# Patient Record
Sex: MALE | Race: WHITE | HISPANIC OR LATINO | Employment: OTHER | URBAN - METROPOLITAN AREA
[De-identification: names, ages, dates, MRNs, and addresses within clinical notes are randomized per-mention and may not be internally consistent; named-entity substitution may affect disease eponyms.]

---

## 2017-01-10 ENCOUNTER — APPOINTMENT (OUTPATIENT)
Dept: LAB | Facility: HOSPITAL | Age: 72
End: 2017-01-10
Payer: COMMERCIAL

## 2017-01-10 ENCOUNTER — GENERIC CONVERSION - ENCOUNTER (OUTPATIENT)
Dept: OTHER | Facility: OTHER | Age: 72
End: 2017-01-10

## 2017-01-10 ENCOUNTER — TRANSCRIBE ORDERS (OUTPATIENT)
Dept: LAB | Facility: HOSPITAL | Age: 72
End: 2017-01-10

## 2017-01-10 DIAGNOSIS — E11.9 TYPE 2 DIABETES MELLITUS WITHOUT COMPLICATIONS (HCC): ICD-10-CM

## 2017-01-10 LAB
ALBUMIN SERPL BCP-MCNC: 3.9 G/DL (ref 3.5–5)
ALP SERPL-CCNC: 79 U/L (ref 46–116)
ALT SERPL W P-5'-P-CCNC: 22 U/L (ref 12–78)
ANION GAP SERPL CALCULATED.3IONS-SCNC: 7 MMOL/L (ref 4–13)
AST SERPL W P-5'-P-CCNC: 11 U/L (ref 5–45)
BILIRUB SERPL-MCNC: 1.06 MG/DL (ref 0.2–1)
BUN SERPL-MCNC: 14 MG/DL (ref 5–25)
CALCIUM SERPL-MCNC: 8.5 MG/DL (ref 8.3–10.1)
CHLORIDE SERPL-SCNC: 97 MMOL/L (ref 100–108)
CHOLEST SERPL-MCNC: 130 MG/DL (ref 50–200)
CO2 SERPL-SCNC: 32 MMOL/L (ref 21–32)
CREAT SERPL-MCNC: 0.96 MG/DL (ref 0.6–1.3)
EST. AVERAGE GLUCOSE BLD GHB EST-MCNC: 174 MG/DL
GFR SERPL CREATININE-BSD FRML MDRD: >60 ML/MIN/1.73SQ M
GLUCOSE SERPL-MCNC: 168 MG/DL (ref 65–140)
HBA1C MFR BLD: 7.7 % (ref 4.2–6.3)
HDLC SERPL-MCNC: 55 MG/DL (ref 40–60)
LDLC SERPL CALC-MCNC: 61 MG/DL (ref 0–100)
POTASSIUM SERPL-SCNC: 4.1 MMOL/L (ref 3.5–5.3)
PROT SERPL-MCNC: 7.1 G/DL (ref 6.4–8.2)
SODIUM SERPL-SCNC: 136 MMOL/L (ref 136–145)
TRIGL SERPL-MCNC: 72 MG/DL
TSH SERPL DL<=0.05 MIU/L-ACNC: 2.77 UIU/ML (ref 0.36–3.74)

## 2017-01-10 PROCEDURE — 83036 HEMOGLOBIN GLYCOSYLATED A1C: CPT

## 2017-01-10 PROCEDURE — 36415 COLL VENOUS BLD VENIPUNCTURE: CPT

## 2017-01-10 PROCEDURE — 84443 ASSAY THYROID STIM HORMONE: CPT

## 2017-01-10 PROCEDURE — 80053 COMPREHEN METABOLIC PANEL: CPT

## 2017-01-10 PROCEDURE — 80061 LIPID PANEL: CPT

## 2017-01-13 ENCOUNTER — ALLSCRIPTS OFFICE VISIT (OUTPATIENT)
Dept: OTHER | Facility: OTHER | Age: 72
End: 2017-01-13

## 2017-02-10 ENCOUNTER — ALLSCRIPTS OFFICE VISIT (OUTPATIENT)
Dept: OTHER | Facility: OTHER | Age: 72
End: 2017-02-10

## 2017-02-23 ENCOUNTER — GENERIC CONVERSION - ENCOUNTER (OUTPATIENT)
Dept: OTHER | Facility: OTHER | Age: 72
End: 2017-02-23

## 2017-02-23 ENCOUNTER — ALLSCRIPTS OFFICE VISIT (OUTPATIENT)
Dept: OTHER | Facility: OTHER | Age: 72
End: 2017-02-23

## 2017-03-23 ENCOUNTER — GENERIC CONVERSION - ENCOUNTER (OUTPATIENT)
Dept: OTHER | Facility: OTHER | Age: 72
End: 2017-03-23

## 2017-03-28 ENCOUNTER — ALLSCRIPTS OFFICE VISIT (OUTPATIENT)
Dept: OTHER | Facility: OTHER | Age: 72
End: 2017-03-28

## 2017-03-28 DIAGNOSIS — H81.10 BENIGN PAROXYSMAL VERTIGO: ICD-10-CM

## 2017-04-03 ENCOUNTER — APPOINTMENT (OUTPATIENT)
Dept: PHYSICAL THERAPY | Facility: CLINIC | Age: 72
End: 2017-04-03
Payer: COMMERCIAL

## 2017-04-03 DIAGNOSIS — H81.10 BENIGN PAROXYSMAL VERTIGO: ICD-10-CM

## 2017-04-03 PROCEDURE — G8991 OTHER PT/OT GOAL STATUS: HCPCS

## 2017-04-03 PROCEDURE — 97162 PT EVAL MOD COMPLEX 30 MIN: CPT

## 2017-04-03 PROCEDURE — G8990 OTHER PT/OT CURRENT STATUS: HCPCS

## 2017-04-03 PROCEDURE — 97112 NEUROMUSCULAR REEDUCATION: CPT

## 2017-04-04 ENCOUNTER — GENERIC CONVERSION - ENCOUNTER (OUTPATIENT)
Dept: OTHER | Facility: OTHER | Age: 72
End: 2017-04-04

## 2017-04-05 ENCOUNTER — APPOINTMENT (OUTPATIENT)
Dept: PHYSICAL THERAPY | Facility: CLINIC | Age: 72
End: 2017-04-05
Payer: COMMERCIAL

## 2017-04-05 ENCOUNTER — GENERIC CONVERSION - ENCOUNTER (OUTPATIENT)
Dept: OTHER | Facility: OTHER | Age: 72
End: 2017-04-05

## 2017-04-10 ENCOUNTER — TRANSCRIBE ORDERS (OUTPATIENT)
Dept: LAB | Facility: HOSPITAL | Age: 72
End: 2017-04-10

## 2017-04-10 ENCOUNTER — APPOINTMENT (OUTPATIENT)
Dept: LAB | Facility: HOSPITAL | Age: 72
End: 2017-04-10
Payer: COMMERCIAL

## 2017-04-10 DIAGNOSIS — E11.65 TYPE 2 DIABETES MELLITUS WITH HYPERGLYCEMIA (HCC): ICD-10-CM

## 2017-04-10 LAB
EST. AVERAGE GLUCOSE BLD GHB EST-MCNC: 146 MG/DL
HBA1C MFR BLD: 6.7 % (ref 4.2–6.3)

## 2017-04-10 PROCEDURE — 83036 HEMOGLOBIN GLYCOSYLATED A1C: CPT

## 2017-04-10 PROCEDURE — 36415 COLL VENOUS BLD VENIPUNCTURE: CPT

## 2017-04-28 ENCOUNTER — ALLSCRIPTS OFFICE VISIT (OUTPATIENT)
Dept: OTHER | Facility: OTHER | Age: 72
End: 2017-04-28

## 2017-05-16 ENCOUNTER — ALLSCRIPTS OFFICE VISIT (OUTPATIENT)
Dept: OTHER | Facility: OTHER | Age: 72
End: 2017-05-16

## 2017-08-04 ENCOUNTER — TRANSCRIBE ORDERS (OUTPATIENT)
Dept: LAB | Facility: HOSPITAL | Age: 72
End: 2017-08-04

## 2017-08-10 DIAGNOSIS — E11.9 TYPE 2 DIABETES MELLITUS WITHOUT COMPLICATIONS (HCC): ICD-10-CM

## 2017-08-11 ENCOUNTER — GENERIC CONVERSION - ENCOUNTER (OUTPATIENT)
Dept: OTHER | Facility: OTHER | Age: 72
End: 2017-08-11

## 2017-08-11 ENCOUNTER — APPOINTMENT (OUTPATIENT)
Dept: LAB | Facility: HOSPITAL | Age: 72
End: 2017-08-11
Payer: COMMERCIAL

## 2017-08-11 DIAGNOSIS — E11.9 TYPE 2 DIABETES MELLITUS WITHOUT COMPLICATIONS (HCC): ICD-10-CM

## 2017-08-11 LAB
ALBUMIN SERPL BCP-MCNC: 3.7 G/DL (ref 3.5–5)
ALP SERPL-CCNC: 76 U/L (ref 46–116)
ALT SERPL W P-5'-P-CCNC: 22 U/L (ref 12–78)
ANION GAP SERPL CALCULATED.3IONS-SCNC: 5 MMOL/L (ref 4–13)
AST SERPL W P-5'-P-CCNC: 15 U/L (ref 5–45)
BASOPHILS # BLD AUTO: 0.1 THOUSANDS/ΜL (ref 0–0.1)
BASOPHILS NFR BLD AUTO: 1 % (ref 0–1)
BILIRUB SERPL-MCNC: 0.77 MG/DL (ref 0.2–1)
BUN SERPL-MCNC: 12 MG/DL (ref 5–25)
CALCIUM SERPL-MCNC: 8.4 MG/DL (ref 8.3–10.1)
CHLORIDE SERPL-SCNC: 101 MMOL/L (ref 100–108)
CHOLEST SERPL-MCNC: 115 MG/DL (ref 50–200)
CO2 SERPL-SCNC: 29 MMOL/L (ref 21–32)
CREAT SERPL-MCNC: 0.93 MG/DL (ref 0.6–1.3)
CREAT UR-MCNC: 79.2 MG/DL
EOSINOPHIL # BLD AUTO: 3.03 THOUSAND/ΜL (ref 0–0.61)
EOSINOPHIL NFR BLD AUTO: 28 % (ref 0–6)
ERYTHROCYTE [DISTWIDTH] IN BLOOD BY AUTOMATED COUNT: 14.4 % (ref 11.6–15.1)
EST. AVERAGE GLUCOSE BLD GHB EST-MCNC: 143 MG/DL
GFR SERPL CREATININE-BSD FRML MDRD: 83 ML/MIN/1.73SQ M
GLUCOSE P FAST SERPL-MCNC: 132 MG/DL (ref 65–99)
HBA1C MFR BLD: 6.6 % (ref 4.2–6.3)
HCT VFR BLD AUTO: 42.9 % (ref 36.5–49.3)
HDLC SERPL-MCNC: 48 MG/DL (ref 40–60)
HGB BLD-MCNC: 14.3 G/DL (ref 12–17)
LDLC SERPL CALC-MCNC: 52 MG/DL (ref 0–100)
LYMPHOCYTES # BLD AUTO: 3.04 THOUSANDS/ΜL (ref 0.6–4.47)
LYMPHOCYTES NFR BLD AUTO: 28 % (ref 14–44)
MCH RBC QN AUTO: 30.6 PG (ref 26.8–34.3)
MCHC RBC AUTO-ENTMCNC: 33.3 G/DL (ref 31.4–37.4)
MCV RBC AUTO: 92 FL (ref 82–98)
MICROALBUMIN UR-MCNC: 8.8 MG/L (ref 0–20)
MICROALBUMIN/CREAT 24H UR: 11 MG/G CREATININE (ref 0–30)
MONOCYTES # BLD AUTO: 0.57 THOUSAND/ΜL (ref 0.17–1.22)
MONOCYTES NFR BLD AUTO: 5 % (ref 4–12)
NEUTROPHILS # BLD AUTO: 4.17 THOUSANDS/ΜL (ref 1.85–7.62)
NEUTS SEG NFR BLD AUTO: 38 % (ref 43–75)
NRBC BLD AUTO-RTO: 0 /100 WBCS
PLATELET # BLD AUTO: 271 THOUSANDS/UL (ref 149–390)
PMV BLD AUTO: 10.3 FL (ref 8.9–12.7)
POTASSIUM SERPL-SCNC: 4.5 MMOL/L (ref 3.5–5.3)
PROT SERPL-MCNC: 7.1 G/DL (ref 6.4–8.2)
RBC # BLD AUTO: 4.68 MILLION/UL (ref 3.88–5.62)
SODIUM SERPL-SCNC: 135 MMOL/L (ref 136–145)
TRIGL SERPL-MCNC: 77 MG/DL
TSH SERPL DL<=0.05 MIU/L-ACNC: 2.52 UIU/ML (ref 0.36–3.74)
WBC # BLD AUTO: 10.93 THOUSAND/UL (ref 4.31–10.16)

## 2017-08-11 PROCEDURE — 80053 COMPREHEN METABOLIC PANEL: CPT

## 2017-08-11 PROCEDURE — 82570 ASSAY OF URINE CREATININE: CPT

## 2017-08-11 PROCEDURE — 36415 COLL VENOUS BLD VENIPUNCTURE: CPT

## 2017-08-11 PROCEDURE — 80061 LIPID PANEL: CPT

## 2017-08-11 PROCEDURE — 82043 UR ALBUMIN QUANTITATIVE: CPT

## 2017-08-11 PROCEDURE — 85025 COMPLETE CBC W/AUTO DIFF WBC: CPT

## 2017-08-11 PROCEDURE — 83036 HEMOGLOBIN GLYCOSYLATED A1C: CPT

## 2017-08-11 PROCEDURE — 84443 ASSAY THYROID STIM HORMONE: CPT

## 2017-08-29 ENCOUNTER — ALLSCRIPTS OFFICE VISIT (OUTPATIENT)
Dept: OTHER | Facility: OTHER | Age: 72
End: 2017-08-29

## 2017-09-18 ENCOUNTER — GENERIC CONVERSION - ENCOUNTER (OUTPATIENT)
Dept: OTHER | Facility: OTHER | Age: 72
End: 2017-09-18

## 2017-09-22 ENCOUNTER — HOSPITAL ENCOUNTER (EMERGENCY)
Facility: HOSPITAL | Age: 72
Discharge: HOME | End: 2017-09-22
Attending: EMERGENCY MEDICINE
Payer: COMMERCIAL

## 2017-09-22 ENCOUNTER — OFFICE VISIT (OUTPATIENT)
Dept: URGENT CARE | Age: 72
End: 2017-09-22
Payer: COMMERCIAL

## 2017-09-22 ENCOUNTER — APPOINTMENT (EMERGENCY)
Dept: CT IMAGING | Facility: HOSPITAL | Age: 72
End: 2017-09-22
Payer: COMMERCIAL

## 2017-09-22 VITALS
TEMPERATURE: 98.2 F | RESPIRATION RATE: 16 BRPM | WEIGHT: 178 LBS | OXYGEN SATURATION: 93 % | SYSTOLIC BLOOD PRESSURE: 129 MMHG | DIASTOLIC BLOOD PRESSURE: 63 MMHG | HEART RATE: 72 BPM

## 2017-09-22 DIAGNOSIS — R20.0 LEFT FACIAL NUMBNESS: Primary | ICD-10-CM

## 2017-09-22 LAB
ANION GAP SERPL CALCULATED.3IONS-SCNC: 6 MMOL/L (ref 4–13)
BASOPHILS # BLD AUTO: 0.1 THOUSANDS/ΜL (ref 0–0.1)
BASOPHILS NFR BLD AUTO: 1 % (ref 0–1)
BUN SERPL-MCNC: 11 MG/DL (ref 5–25)
CALCIUM SERPL-MCNC: 9.9 MG/DL (ref 8.3–10.1)
CHLORIDE SERPL-SCNC: 101 MMOL/L (ref 100–108)
CO2 SERPL-SCNC: 32 MMOL/L (ref 21–32)
CREAT SERPL-MCNC: 0.88 MG/DL (ref 0.6–1.3)
EOSINOPHIL # BLD AUTO: 6.41 THOUSAND/ΜL (ref 0–0.61)
EOSINOPHIL NFR BLD AUTO: 43 % (ref 0–6)
ERYTHROCYTE [DISTWIDTH] IN BLOOD BY AUTOMATED COUNT: 14.2 % (ref 11.6–15.1)
GFR SERPL CREATININE-BSD FRML MDRD: 88 ML/MIN/1.73SQ M
GLUCOSE SERPL-MCNC: 116 MG/DL (ref 65–140)
HCT VFR BLD AUTO: 43.7 % (ref 36.5–49.3)
HGB BLD-MCNC: 14.9 G/DL (ref 12–17)
LYMPHOCYTES # BLD AUTO: 2.98 THOUSANDS/ΜL (ref 0.6–4.47)
LYMPHOCYTES NFR BLD AUTO: 20 % (ref 14–44)
MCH RBC QN AUTO: 31.4 PG (ref 26.8–34.3)
MCHC RBC AUTO-ENTMCNC: 34.1 G/DL (ref 31.4–37.4)
MCV RBC AUTO: 92 FL (ref 82–98)
MONOCYTES # BLD AUTO: 0.58 THOUSAND/ΜL (ref 0.17–1.22)
MONOCYTES NFR BLD AUTO: 4 % (ref 4–12)
NEUTROPHILS # BLD AUTO: 4.84 THOUSANDS/ΜL (ref 1.85–7.62)
NEUTS SEG NFR BLD AUTO: 32 % (ref 43–75)
NRBC BLD AUTO-RTO: 0 /100 WBCS
PLATELET # BLD AUTO: 308 THOUSANDS/UL (ref 149–390)
PMV BLD AUTO: 10.1 FL (ref 8.9–12.7)
POTASSIUM SERPL-SCNC: 3.8 MMOL/L (ref 3.5–5.3)
RBC # BLD AUTO: 4.74 MILLION/UL (ref 3.88–5.62)
SODIUM SERPL-SCNC: 139 MMOL/L (ref 136–145)
WBC # BLD AUTO: 14.91 THOUSAND/UL (ref 4.31–10.16)

## 2017-09-22 PROCEDURE — S9088 SERVICES PROVIDED IN URGENT: HCPCS | Performed by: FAMILY MEDICINE

## 2017-09-22 PROCEDURE — 99213 OFFICE O/P EST LOW 20 MIN: CPT | Performed by: FAMILY MEDICINE

## 2017-09-22 PROCEDURE — 80048 BASIC METABOLIC PNL TOTAL CA: CPT | Performed by: EMERGENCY MEDICINE

## 2017-09-22 PROCEDURE — 36415 COLL VENOUS BLD VENIPUNCTURE: CPT | Performed by: EMERGENCY MEDICINE

## 2017-09-22 PROCEDURE — 99284 EMERGENCY DEPT VISIT MOD MDM: CPT

## 2017-09-22 PROCEDURE — 70450 CT HEAD/BRAIN W/O DYE: CPT

## 2017-09-22 PROCEDURE — 85025 COMPLETE CBC W/AUTO DIFF WBC: CPT | Performed by: EMERGENCY MEDICINE

## 2017-09-22 RX ORDER — AMLODIPINE BESYLATE 10 MG/1
10 TABLET ORAL DAILY
COMMUNITY
End: 2018-06-07 | Stop reason: SDUPTHER

## 2017-09-22 RX ORDER — TADALAFIL 20 MG/1
20 TABLET ORAL DAILY PRN
COMMUNITY

## 2017-09-22 RX ORDER — PREDNISONE 20 MG/1
60 TABLET ORAL DAILY
Qty: 15 TABLET | Refills: 0 | Status: SHIPPED | OUTPATIENT
Start: 2017-09-22 | End: 2017-09-27

## 2017-09-22 RX ORDER — FINASTERIDE 5 MG/1
5 TABLET, FILM COATED ORAL DAILY
COMMUNITY
End: 2018-04-29 | Stop reason: SDUPTHER

## 2017-09-22 RX ORDER — MONTELUKAST SODIUM 10 MG/1
10 TABLET ORAL
COMMUNITY
End: 2018-08-28 | Stop reason: SDUPTHER

## 2017-09-22 RX ORDER — LISINOPRIL AND HYDROCHLOROTHIAZIDE 20; 12.5 MG/1; MG/1
1 TABLET ORAL DAILY
COMMUNITY
End: 2018-06-08 | Stop reason: SDUPTHER

## 2017-09-22 RX ORDER — FLUTICASONE PROPIONATE 50 MCG
1 SPRAY, SUSPENSION (ML) NASAL DAILY
COMMUNITY
End: 2018-12-18 | Stop reason: SDUPTHER

## 2017-09-22 RX ORDER — GLIMEPIRIDE 2 MG/1
2 TABLET ORAL
COMMUNITY
End: 2018-03-31 | Stop reason: SDUPTHER

## 2017-09-22 RX ORDER — CHLORAL HYDRATE 500 MG
1000 CAPSULE ORAL DAILY
COMMUNITY

## 2017-09-22 RX ORDER — MELATONIN
1000 DAILY
COMMUNITY

## 2017-09-22 RX ORDER — MELOXICAM 7.5 MG/1
7.5 TABLET ORAL DAILY
COMMUNITY
End: 2017-11-13 | Stop reason: HOSPADM

## 2017-09-22 RX ORDER — MULTIVIT-MIN/IRON FUM/FOLIC AC 7.5 MG-4
1 TABLET ORAL DAILY
COMMUNITY

## 2017-09-22 RX ORDER — AZELASTINE HCL 205.5 UG/1
2 SPRAY NASAL DAILY
COMMUNITY

## 2017-09-25 ENCOUNTER — LAB REQUISITION (OUTPATIENT)
Dept: LAB | Facility: HOSPITAL | Age: 72
End: 2017-09-25
Payer: COMMERCIAL

## 2017-09-25 ENCOUNTER — TRANSCRIBE ORDERS (OUTPATIENT)
Dept: ADMINISTRATIVE | Facility: HOSPITAL | Age: 72
End: 2017-09-25

## 2017-09-25 ENCOUNTER — GENERIC CONVERSION - ENCOUNTER (OUTPATIENT)
Dept: OTHER | Facility: OTHER | Age: 72
End: 2017-09-25

## 2017-09-25 ENCOUNTER — APPOINTMENT (OUTPATIENT)
Dept: LAB | Facility: HOSPITAL | Age: 72
End: 2017-09-25
Payer: COMMERCIAL

## 2017-09-25 DIAGNOSIS — M35.00 SICCA SYNDROME (HCC): ICD-10-CM

## 2017-09-25 DIAGNOSIS — E40 EDEMA DUE TO KWASHIORKOR (HCC): Primary | ICD-10-CM

## 2017-09-25 LAB
CRP SERPL QL: 4.8 MG/L
ERYTHROCYTE [SEDIMENTATION RATE] IN BLOOD: 2 MM/HOUR (ref 0–10)

## 2017-09-25 PROCEDURE — 36415 COLL VENOUS BLD VENIPUNCTURE: CPT

## 2017-09-25 PROCEDURE — 85652 RBC SED RATE AUTOMATED: CPT | Performed by: FAMILY MEDICINE

## 2017-09-25 PROCEDURE — 86038 ANTINUCLEAR ANTIBODIES: CPT | Performed by: FAMILY MEDICINE

## 2017-09-25 PROCEDURE — 86235 NUCLEAR ANTIGEN ANTIBODY: CPT

## 2017-09-25 PROCEDURE — 86140 C-REACTIVE PROTEIN: CPT | Performed by: FAMILY MEDICINE

## 2017-09-26 LAB
ENA SS-A AB SER-ACNC: <0.2 AI (ref 0–0.9)
ENA SS-B AB SER-ACNC: <0.2 AI (ref 0–0.9)

## 2017-09-27 LAB — RYE IGE QN: NEGATIVE

## 2017-10-03 ENCOUNTER — GENERIC CONVERSION - ENCOUNTER (OUTPATIENT)
Dept: OTHER | Facility: OTHER | Age: 72
End: 2017-10-03

## 2017-10-03 ENCOUNTER — HOSPITAL ENCOUNTER (OUTPATIENT)
Dept: RADIOLOGY | Facility: HOSPITAL | Age: 72
Discharge: HOME | End: 2017-10-03
Payer: COMMERCIAL

## 2017-10-03 DIAGNOSIS — E40 EDEMA DUE TO KWASHIORKOR (HCC): ICD-10-CM

## 2017-10-03 PROCEDURE — 70490 CT SOFT TISSUE NECK W/O DYE: CPT

## 2017-11-09 ENCOUNTER — HOSPITAL ENCOUNTER (INPATIENT)
Facility: HOSPITAL | Age: 72
LOS: 2 days | Discharge: HOME | DRG: 379 | End: 2017-11-13
Attending: EMERGENCY MEDICINE | Admitting: INTERNAL MEDICINE
Payer: COMMERCIAL

## 2017-11-09 DIAGNOSIS — K92.1 MELENA: ICD-10-CM

## 2017-11-09 DIAGNOSIS — K62.5 BRBPR (BRIGHT RED BLOOD PER RECTUM): ICD-10-CM

## 2017-11-09 DIAGNOSIS — K62.5 BRIGHT RED BLOOD PER RECTUM: Primary | ICD-10-CM

## 2017-11-09 PROBLEM — I10 HYPERTENSION: Chronic | Status: ACTIVE | Noted: 2017-11-09

## 2017-11-09 PROBLEM — E13.9 DIABETES 1.5, MANAGED AS TYPE 2 (HCC): Status: ACTIVE | Noted: 2017-11-09

## 2017-11-09 PROBLEM — D72.829 LEUKOCYTOSIS: Status: ACTIVE | Noted: 2017-11-09

## 2017-11-09 PROBLEM — M19.90 OSTEOARTHRITIS: Status: ACTIVE | Noted: 2017-11-09

## 2017-11-09 PROBLEM — M19.90 OSTEOARTHRITIS: Chronic | Status: ACTIVE | Noted: 2017-11-09

## 2017-11-09 PROBLEM — I10 HYPERTENSION: Status: ACTIVE | Noted: 2017-11-09

## 2017-11-09 PROBLEM — E13.9 DIABETES 1.5, MANAGED AS TYPE 2 (HCC): Chronic | Status: ACTIVE | Noted: 2017-11-09

## 2017-11-09 LAB
ABO GROUP BLD: NORMAL
ALBUMIN SERPL BCP-MCNC: 3.4 G/DL (ref 3.5–5)
ALP SERPL-CCNC: 89 U/L (ref 46–116)
ALT SERPL W P-5'-P-CCNC: 20 U/L (ref 12–78)
ANION GAP SERPL CALCULATED.3IONS-SCNC: 8 MMOL/L (ref 4–13)
APTT PPP: 30 SECONDS (ref 23–35)
AST SERPL W P-5'-P-CCNC: 11 U/L (ref 5–45)
BASOPHILS # BLD AUTO: 0.12 THOUSANDS/ΜL (ref 0–0.1)
BASOPHILS NFR BLD AUTO: 1 % (ref 0–1)
BILIRUB SERPL-MCNC: 0.39 MG/DL (ref 0.2–1)
BLD GP AB SCN SERPL QL: NEGATIVE
BUN SERPL-MCNC: 15 MG/DL (ref 5–25)
CALCIUM SERPL-MCNC: 8.1 MG/DL (ref 8.3–10.1)
CHLORIDE SERPL-SCNC: 104 MMOL/L (ref 100–108)
CO2 SERPL-SCNC: 26 MMOL/L (ref 21–32)
CREAT SERPL-MCNC: 0.82 MG/DL (ref 0.6–1.3)
EOSINOPHIL # BLD AUTO: 1.18 THOUSAND/ΜL (ref 0–0.61)
EOSINOPHIL NFR BLD AUTO: 7 % (ref 0–6)
ERYTHROCYTE [DISTWIDTH] IN BLOOD BY AUTOMATED COUNT: 14.2 % (ref 11.6–15.1)
GFR SERPL CREATININE-BSD FRML MDRD: 90 ML/MIN/1.73SQ M
GLUCOSE SERPL-MCNC: 200 MG/DL (ref 65–140)
GLUCOSE SERPL-MCNC: 83 MG/DL (ref 65–140)
HCT VFR BLD AUTO: 42.2 % (ref 36.5–49.3)
HGB BLD-MCNC: 14.3 G/DL (ref 12–17)
INR PPP: 1.19 (ref 0.86–1.16)
LYMPHOCYTES # BLD AUTO: 2.2 THOUSANDS/ΜL (ref 0.6–4.47)
LYMPHOCYTES NFR BLD AUTO: 14 % (ref 14–44)
MCH RBC QN AUTO: 31.2 PG (ref 26.8–34.3)
MCHC RBC AUTO-ENTMCNC: 33.9 G/DL (ref 31.4–37.4)
MCV RBC AUTO: 92 FL (ref 82–98)
MONOCYTES # BLD AUTO: 1.11 THOUSAND/ΜL (ref 0.17–1.22)
MONOCYTES NFR BLD AUTO: 7 % (ref 4–12)
NEUTROPHILS # BLD AUTO: 11.68 THOUSANDS/ΜL (ref 1.85–7.62)
NEUTS SEG NFR BLD AUTO: 71 % (ref 43–75)
NRBC BLD AUTO-RTO: 0 /100 WBCS
PA ADP BLD-ACNC: 434 ARU
PLATELET # BLD AUTO: 273 THOUSANDS/UL (ref 149–390)
PMV BLD AUTO: 10.6 FL (ref 8.9–12.7)
POTASSIUM SERPL-SCNC: 4 MMOL/L (ref 3.5–5.3)
PROT SERPL-MCNC: 6.4 G/DL (ref 6.4–8.2)
PROTHROMBIN TIME: 15.2 SECONDS (ref 12.1–14.4)
RBC # BLD AUTO: 4.59 MILLION/UL (ref 3.88–5.62)
RH BLD: POSITIVE
SODIUM SERPL-SCNC: 138 MMOL/L (ref 136–145)
SPECIMEN EXPIRATION DATE: NORMAL
WBC # BLD AUTO: 16.35 THOUSAND/UL (ref 4.31–10.16)

## 2017-11-09 PROCEDURE — 99285 EMERGENCY DEPT VISIT HI MDM: CPT

## 2017-11-09 PROCEDURE — 80053 COMPREHEN METABOLIC PANEL: CPT | Performed by: EMERGENCY MEDICINE

## 2017-11-09 PROCEDURE — 86850 RBC ANTIBODY SCREEN: CPT | Performed by: EMERGENCY MEDICINE

## 2017-11-09 PROCEDURE — C9113 INJ PANTOPRAZOLE SODIUM, VIA: HCPCS | Performed by: HOSPITALIST

## 2017-11-09 PROCEDURE — 94760 N-INVAS EAR/PLS OXIMETRY 1: CPT

## 2017-11-09 PROCEDURE — 82948 REAGENT STRIP/BLOOD GLUCOSE: CPT

## 2017-11-09 PROCEDURE — 85025 COMPLETE CBC W/AUTO DIFF WBC: CPT | Performed by: EMERGENCY MEDICINE

## 2017-11-09 PROCEDURE — 86900 BLOOD TYPING SEROLOGIC ABO: CPT | Performed by: EMERGENCY MEDICINE

## 2017-11-09 PROCEDURE — 85730 THROMBOPLASTIN TIME PARTIAL: CPT | Performed by: EMERGENCY MEDICINE

## 2017-11-09 PROCEDURE — 86901 BLOOD TYPING SEROLOGIC RH(D): CPT | Performed by: EMERGENCY MEDICINE

## 2017-11-09 PROCEDURE — 85576 BLOOD PLATELET AGGREGATION: CPT | Performed by: EMERGENCY MEDICINE

## 2017-11-09 PROCEDURE — 36415 COLL VENOUS BLD VENIPUNCTURE: CPT | Performed by: EMERGENCY MEDICINE

## 2017-11-09 PROCEDURE — 85610 PROTHROMBIN TIME: CPT | Performed by: EMERGENCY MEDICINE

## 2017-11-09 RX ORDER — AMLODIPINE BESYLATE 10 MG/1
10 TABLET ORAL DAILY
Status: DISCONTINUED | OUTPATIENT
Start: 2017-11-10 | End: 2017-11-10

## 2017-11-09 RX ORDER — SENNOSIDES 8.6 MG
1 TABLET ORAL DAILY
Status: DISCONTINUED | OUTPATIENT
Start: 2017-11-10 | End: 2017-11-10

## 2017-11-09 RX ORDER — FINASTERIDE 5 MG/1
5 TABLET, FILM COATED ORAL DAILY
Status: DISCONTINUED | OUTPATIENT
Start: 2017-11-10 | End: 2017-11-13 | Stop reason: HOSPADM

## 2017-11-09 RX ORDER — ACETAMINOPHEN 325 MG/1
650 TABLET ORAL EVERY 6 HOURS PRN
Status: DISCONTINUED | OUTPATIENT
Start: 2017-11-09 | End: 2017-11-13 | Stop reason: HOSPADM

## 2017-11-09 RX ORDER — MONTELUKAST SODIUM 10 MG/1
10 TABLET ORAL
Status: DISCONTINUED | OUTPATIENT
Start: 2017-11-09 | End: 2017-11-13 | Stop reason: HOSPADM

## 2017-11-09 RX ORDER — MECLIZINE HCL 12.5 MG/1
25 TABLET ORAL 3 TIMES DAILY PRN
Status: DISCONTINUED | OUTPATIENT
Start: 2017-11-09 | End: 2017-11-13 | Stop reason: HOSPADM

## 2017-11-09 RX ORDER — MELATONIN
1000 DAILY
Status: DISCONTINUED | OUTPATIENT
Start: 2017-11-10 | End: 2017-11-13 | Stop reason: HOSPADM

## 2017-11-09 RX ORDER — PANTOPRAZOLE SODIUM 40 MG/1
40 INJECTION, POWDER, FOR SOLUTION INTRAVENOUS EVERY 12 HOURS SCHEDULED
Status: DISCONTINUED | OUTPATIENT
Start: 2017-11-09 | End: 2017-11-13 | Stop reason: HOSPADM

## 2017-11-09 RX ORDER — AZELASTINE HCL 205.5 UG/1
2 SPRAY NASAL DAILY
Status: DISCONTINUED | OUTPATIENT
Start: 2017-11-10 | End: 2017-11-09 | Stop reason: CLARIF

## 2017-11-09 RX ORDER — ONDANSETRON 2 MG/ML
4 INJECTION INTRAMUSCULAR; INTRAVENOUS EVERY 6 HOURS PRN
Status: DISCONTINUED | OUTPATIENT
Start: 2017-11-09 | End: 2017-11-13 | Stop reason: HOSPADM

## 2017-11-09 RX ORDER — OXYCODONE HYDROCHLORIDE 5 MG/1
2.5 TABLET ORAL EVERY 4 HOURS PRN
Status: DISCONTINUED | OUTPATIENT
Start: 2017-11-09 | End: 2017-11-13 | Stop reason: HOSPADM

## 2017-11-09 RX ORDER — FLUTICASONE PROPIONATE 50 MCG
1 SPRAY, SUSPENSION (ML) NASAL DAILY
Status: DISCONTINUED | OUTPATIENT
Start: 2017-11-10 | End: 2017-11-13 | Stop reason: HOSPADM

## 2017-11-09 RX ORDER — AZELASTINE 1 MG/ML
2 SPRAY, METERED NASAL DAILY
Status: DISCONTINUED | OUTPATIENT
Start: 2017-11-10 | End: 2017-11-13 | Stop reason: HOSPADM

## 2017-11-09 RX ORDER — CHLORAL HYDRATE 500 MG
1000 CAPSULE ORAL DAILY
Status: DISCONTINUED | OUTPATIENT
Start: 2017-11-10 | End: 2017-11-13 | Stop reason: HOSPADM

## 2017-11-09 RX ORDER — OXYCODONE HYDROCHLORIDE 5 MG/1
5 TABLET ORAL EVERY 4 HOURS PRN
Status: DISCONTINUED | OUTPATIENT
Start: 2017-11-09 | End: 2017-11-13 | Stop reason: HOSPADM

## 2017-11-09 RX ORDER — DOCUSATE SODIUM 100 MG/1
100 CAPSULE, LIQUID FILLED ORAL 2 TIMES DAILY
Status: DISCONTINUED | OUTPATIENT
Start: 2017-11-09 | End: 2017-11-10

## 2017-11-09 RX ORDER — SODIUM CHLORIDE 9 MG/ML
75 INJECTION, SOLUTION INTRAVENOUS CONTINUOUS
Status: DISCONTINUED | OUTPATIENT
Start: 2017-11-09 | End: 2017-11-10

## 2017-11-09 RX ADMIN — DOCUSATE SODIUM 100 MG: 100 CAPSULE, LIQUID FILLED ORAL at 22:55

## 2017-11-09 RX ADMIN — SODIUM CHLORIDE 75 ML/HR: 0.9 INJECTION, SOLUTION INTRAVENOUS at 22:58

## 2017-11-09 RX ADMIN — PANTOPRAZOLE SODIUM 40 MG: 40 INJECTION, POWDER, FOR SOLUTION INTRAVENOUS at 22:55

## 2017-11-09 RX ADMIN — MONTELUKAST SODIUM 10 MG: 10 TABLET, FILM COATED ORAL at 22:55

## 2017-11-09 NOTE — CONSULTS
Consultation - Colorectal Surgery   Jen Calvillo 81 y o  male MRN: [id number]  Unit/Bed#: ED 05 Encounter: [id number]    Assessment/Plan     Assessment:  - 82 yo male patient with  chronic use of aspirin and meloxicam, presenting with initial episode of melena and 2 more episodes of possible hematochezia  - Currently hemodynamically stable  - No further bleeding      Plan:  Outpatient f/u with Dr Harrison Kayser        History of Present Illness   HPI:  Jen Calvillo is a 81 y o  male who presents with 3 episodes of GI bleeding, patient has past medical history significant for diabetes, hypertension and arthritis, patient uses aspirin and meloxicam daily, states that today he went to the bathroom and noticed tarry stools on the toilet, after that initial episode he had another 2 episodes of lower GI bleeding, these 2 last episodes had possibly brighter blood  Patient denies any symptoms associated to the episode of bleeding, currently denies any nausea or vomiting, denies any abdominal pain, denies diarrhea, patient states he had a colonoscopy 3 years ago that was normal, no family history of colon cancer or inflammatory bowel disease, no previous abdominal surgeries    Consults    Review of Systems   Constitutional: Negative  HENT: Negative  Eyes: Negative  Respiratory: Negative  Cardiovascular: Negative  Gastrointestinal: Positive for anal bleeding  Negative for abdominal pain, diarrhea, nausea, rectal pain and vomiting  Genitourinary: Negative  Musculoskeletal: Negative  Skin: Negative  Allergic/Immunologic: Negative  Neurological: Negative  Hematological: Negative  Psychiatric/Behavioral: Negative  All other systems reviewed and are negative  Historical Information   Past Medical History:   Diagnosis Date    Diabetes mellitus ([facility] [address] )     Hyperlipidemia     Hypertension      History reviewed  No pertinent surgical history    Social History   History   Alcohol Use    Yes Comment: social     History   Drug Use No     History   Smoking Status    Never Smoker   Smokeless Tobacco    Never Used     Family History: History reviewed  No pertinent family history  Meds/Allergies   all current active meds have been reviewed, current meds:   No current facility-administered medications for this encounter  and PTA meds:   Prior to Admission Medications   Prescriptions Last Dose Informant Patient Reported? Taking?    Azelastine HCl 0 15 % SOLN   Yes No   Si sprays into each nostril daily   Multiple Vitamins-Minerals (MULTIVITAMIN WITH MINERALS) tablet   Yes No   Sig: Take 1 tablet by mouth daily   Omega-3 Fatty Acids (FISH OIL) 1,000 mg   Yes No   Sig: Take 1,000 mg by mouth daily   amLODIPine (NORVASC) 10 mg tablet   Yes No   Sig: Take 10 mg by mouth daily   cholecalciferol (VITAMIN D3) 1,000 units tablet   Yes No   Sig: Take 1,000 Units by mouth daily   finasteride (PROSCAR) 5 mg tablet   Yes No   Sig: Take 5 mg by mouth daily   fluticasone (FLONASE) 50 mcg/act nasal spray   Yes No   Si spray into each nostril daily   glimepiride (AMARYL) 2 mg tablet   Yes No   Sig: Take 2 mg by mouth every morning before breakfast   glucose blood test strip   Yes No   Si each by Other route as needed Use as instructed   hypromellose (ARTIFICIAL TEARS) 0 4 % SOLN   Yes No   Si drop 4 (four) times a day as needed for dry eyes   lisinopril-hydrochlorothiazide (PRINZIDE,ZESTORETIC) 20-12 5 MG per tablet   Yes No   Sig: Take 1 tablet by mouth daily   meclizine (ANTIVERT) 32 MG tablet   Yes No   Sig: Take 25 mg by mouth 3 (three) times a day as needed for dizziness   meloxicam (MOBIC) 7 5 mg tablet   Yes No   Sig: Take 7 5 mg by mouth daily   metFORMIN (GLUCOPHAGE) 1000 MG tablet   Yes No   Sig: Take 1,000 mg by mouth 2 (two) times a day with meals   montelukast (SINGULAIR) 10 mg tablet   Yes No   Sig: Take 10 mg by mouth daily at bedtime   sitaGLIPtin (JANUVIA) 25 mg tablet   Yes No Sig: Take 25 mg by mouth daily   tadalafil (CIALIS) 20 MG tablet   Yes No   Sig: Take 20 mg by mouth daily as needed for erectile dysfunction      Facility-Administered Medications: None     Allergies   Allergen Reactions    Iodinated Diagnostic Agents        Objective   First Vitals:   Blood Pressure: 154/80 (11/09/17 1722)  Pulse: 105 (11/09/17 1722)  Temperature: 98 1 °F (36 7 °C) (11/09/17 1722)  Temp Source: Oral (11/09/17 1722)  Respirations: 18 (11/09/17 1722)  Weight - Scale: 76 2 kg (168 lb) (11/09/17 1722)  SpO2: 97 % (11/09/17 1722)    Current Vitals:   Blood Pressure: 133/70 (11/09/17 1815)  Pulse: 98 (11/09/17 1815)  Temperature: 98 1 °F (36 7 °C) (11/09/17 1722)  Temp Source: Oral (11/09/17 1722)  Respirations: 18 (11/09/17 1722)  Weight - Scale: 76 2 kg (168 lb) (11/09/17 1722)  SpO2: 96 % (11/09/17 1815)    No intake or output data in the 24 hours ending 11/09/17 1850    Invasive Devices          No matching active lines, drains, or airways          Physical Exam   Constitutional: He is oriented to person, place, and time  He appears well-developed and well-nourished  HENT:   Head: Normocephalic and atraumatic  Eyes: Pupils are equal, round, and reactive to light  Neck: Normal range of motion  Neck supple  Cardiovascular: Normal rate and regular rhythm  Pulmonary/Chest: Effort normal and breath sounds normal    Abdominal: Soft  Bowel sounds are normal  He exhibits no distension and no mass  There is no tenderness  There is no rebound and no guarding  Genitourinary: Rectum normal    Genitourinary Comments: Rectal exam showed no external hemorrhoids and brown stools   Musculoskeletal: Normal range of motion  Neurological: He is alert and oriented to person, place, and time  Skin: Skin is warm and dry  Psychiatric: He has a normal mood and affect  Nursing note and vitals reviewed  Lab Results:   I have personally reviewed pertinent lab results    , CBC:   Lab Results Component Value Date    WBC 16 35 (H) 11/09/2017    HGB 14 3 11/09/2017    HCT 42 2 11/09/2017    MCV 92 11/09/2017     11/09/2017    MCH 31 2 11/09/2017    MCHC 33 9 11/09/2017    RDW 14 2 11/09/2017    MPV 10 6 11/09/2017    NRBC 0 11/09/2017   , CMP: No results found for: NA, K, CL, CO2, ANIONGAP, BUN, CREATININE, GLUCOSE, CALCIUM, AST, ALT, ALKPHOS, PROT, ALBUMIN, BILITOT, EGFR, Coagulation: No results found for: PT, INR, APTT, Urinalysis: No results found for: COLORU, CLARITYU, SPECGRAV, PHUR, LEUKOCYTESUR, NITRITE, PROTEINUA, GLUCOSEU, KETONESU, BILIRUBINUR, BLOODU, Amylase: No results found for: AMYLASE, Lipase: No results found for: LIPASE  Imaging: I have personally reviewed pertinent reports  and I have personally reviewed pertinent films in PACS  EKG, Pathology, and Other Studies: I have personally reviewed pertinent reports  and I have personally reviewed pertinent films in PACS    Counseling / Coordination of Care  Total floor / unit time spent today 60 minutes  Greater than 50% of total time was spent with the patient and / or family counseling and / or coordination of care

## 2017-11-09 NOTE — ED ATTENDING ATTESTATION
Eloise Sousa DO, saw and evaluated the patient  I have discussed the patient with the resident/non-physician practitioner and agree with the resident's/non-physician practitioner's findings, Plan of Care, and MDM as documented in the resident's/non-physician practitioner's note, except where noted  All available labs and Radiology studies were reviewed  At this point I agree with the current assessment done in the Emergency Department  I have conducted an independent evaluation of this patient including a focused history and a physical exam     ED Note - Yasir Will 81 y o  male MRN: [id number]  Unit/Bed#: ED 05 Encounter: [id number]    History of Present Illness   HPI  Yasir Will is a 81 y o  male who presents for evaluation of GI hemorrhage as evidenced by bright red blood per rectum  Symptoms onset approximately 1600 hours today  Patient states that he did feel some diffuse abdominal pressure and when he went to the bathroom, he noted bright red blood in the toilet bowl  Patient had an additional episode in the emergency department  He is otherwise asymptomatic at this time  History of diverticulosis  Pt  Does take ASA daily  No other complaints  REVIEW OF SYSTEMS  See HPI for further details  12 systems reviewed and otherwise negative except as noted  Historical Information     PAST MEDICAL HISTORY  Past Medical History:   Diagnosis Date    Diabetes mellitus ([facility] [address] )     Hyperlipidemia     Hypertension        FAMILY HISTORY  History reviewed  No pertinent family history      SOCIAL HISTORY  Social History     Social History    Marital status: /Civil Union     Spouse name: N/A    Number of children: N/A    Years of education: N/A     Social History Main Topics    Smoking status: Never Smoker    Smokeless tobacco: Never Used    Alcohol use Yes      Comment: social    Drug use: No    Sexual activity: Not Asked     Other Topics Concern    None     Social History Narrative  None       SURGICAL HISTORY  History reviewed  No pertinent surgical history  Meds/Allergies     CURRENT MEDICATIONS  No current facility-administered medications for this encounter       Current Outpatient Prescriptions:     amLODIPine (NORVASC) 10 mg tablet, Take 10 mg by mouth daily, Disp: , Rfl:     Azelastine HCl 0 15 % SOLN, 2 sprays into each nostril daily, Disp: , Rfl:     cholecalciferol (VITAMIN D3) 1,000 units tablet, Take 1,000 Units by mouth daily, Disp: , Rfl:     finasteride (PROSCAR) 5 mg tablet, Take 5 mg by mouth daily, Disp: , Rfl:     fluticasone (FLONASE) 50 mcg/act nasal spray, 1 spray into each nostril daily, Disp: , Rfl:     glimepiride (AMARYL) 2 mg tablet, Take 2 mg by mouth every morning before breakfast, Disp: , Rfl:     glucose blood test strip, 1 each by Other route as needed Use as instructed, Disp: , Rfl:     hypromellose (ARTIFICIAL TEARS) 0 4 % SOLN, 1 drop 4 (four) times a day as needed for dry eyes, Disp: , Rfl:     lisinopril-hydrochlorothiazide (PRINZIDE,ZESTORETIC) 20-12 5 MG per tablet, Take 1 tablet by mouth daily, Disp: , Rfl:     meclizine (ANTIVERT) 32 MG tablet, Take 25 mg by mouth 3 (three) times a day as needed for dizziness, Disp: , Rfl:     meloxicam (MOBIC) 7 5 mg tablet, Take 7 5 mg by mouth daily, Disp: , Rfl:     metFORMIN (GLUCOPHAGE) 1000 MG tablet, Take 1,000 mg by mouth 2 (two) times a day with meals, Disp: , Rfl:     montelukast (SINGULAIR) 10 mg tablet, Take 10 mg by mouth daily at bedtime, Disp: , Rfl:     Multiple Vitamins-Minerals (MULTIVITAMIN WITH MINERALS) tablet, Take 1 tablet by mouth daily, Disp: , Rfl:     Omega-3 Fatty Acids (FISH OIL) 1,000 mg, Take 1,000 mg by mouth daily, Disp: , Rfl:     sitaGLIPtin (JANUVIA) 25 mg tablet, Take 25 mg by mouth daily, Disp: , Rfl:     tadalafil (CIALIS) 20 MG tablet, Take 20 mg by mouth daily as needed for erectile dysfunction, Disp: , Rfl:     (Not in a hospital admission)    ALLERGIES  Allergies   Allergen Reactions    Iodinated Diagnostic Agents      Objective     PHYSICAL EXAM    VITAL SIGNS: Blood pressure 133/70, pulse 98, temperature 98 1 °F (36 7 °C), temperature source Oral, resp  rate 18, weight 76 2 kg (168 lb), SpO2 96 %  Constitutional:  Well developed, well nourished, no acute distress, non-toxic appearance   Eyes:  PERRL, EOMI, conjunctivae pink, sclerae non-icteric  HENT:  Normocephalic/Atraumatic, no rhinorrhea, mucous membranes moist  Respiratory:  No respiratory distress, normal breath sounds  Cardiovascular:  Normal rate, normal rhythm    GI:  Soft, non-tender, non-distended, normal bowel sounds, no organomegaly, no mass, no rebound, no guarding  Hemoccult grossly positive  :  No CVAT, no flank ecchymosis   Musculoskeletal:  No swelling or edema, no tenderness, no deformities  Integument:  Pink, warm, dry, Well hydrated, no rash, no erythema, no bullae   Lymphatic:  No cervical/ tonsillar/ submandibular lymphadenopathy noted   Neurologic:  Awake, Alert & oriented x 3, CN 2-12 intact, no focal neurological deficits  Psychiatric:  Speech and behavior appropriate       ED COURSE and MDM:    Assessment/Plan   Assessment:  82 yo male presents for evaluation of GI hemorrhage as evidenced by bright red blood per rectum  Symptoms onset approximately 1600 hours today  Patient states that he did feel some diffuse abdominal pressure and when he went to the bathroom, he noted bright red blood in the toilet bowl  Patient had an additional episode in the emergency department  He is otherwise asymptomatic at this time  History of diverticulosis  Pt  Does take ASA daily  No other complaints  Patient with severe anaphylactic reaction to IV contrast      Plan:  Lab, PRBC prn, Platelets prn, symptom management, colorectal surgery consult, GI consult, admission  Portions of the record may have been created with voice recognition software   Occasional wrong word or "sound a like" substitutions may have occurred due to the inherent limitations of voice recognition software       ED Provider  Electronically Signed by

## 2017-11-10 ENCOUNTER — GENERIC CONVERSION - ENCOUNTER (OUTPATIENT)
Dept: OTHER | Facility: OTHER | Age: 72
End: 2017-11-10

## 2017-11-10 ENCOUNTER — ANESTHESIA EVENT (OUTPATIENT)
Dept: GASTROENTEROLOGY | Facility: HOSPITAL | Age: 72
DRG: 379 | End: 2017-11-10
Payer: COMMERCIAL

## 2017-11-10 ENCOUNTER — ANESTHESIA (OUTPATIENT)
Dept: GASTROENTEROLOGY | Facility: HOSPITAL | Age: 72
DRG: 379 | End: 2017-11-10
Payer: COMMERCIAL

## 2017-11-10 PROBLEM — K92.1 MELENA: Status: ACTIVE | Noted: 2017-11-09

## 2017-11-10 LAB
ANION GAP SERPL CALCULATED.3IONS-SCNC: 6 MMOL/L (ref 4–13)
BUN SERPL-MCNC: 14 MG/DL (ref 5–25)
CALCIUM SERPL-MCNC: 7.6 MG/DL (ref 8.3–10.1)
CHLORIDE SERPL-SCNC: 105 MMOL/L (ref 100–108)
CO2 SERPL-SCNC: 25 MMOL/L (ref 21–32)
CREAT SERPL-MCNC: 0.75 MG/DL (ref 0.6–1.3)
ERYTHROCYTE [DISTWIDTH] IN BLOOD BY AUTOMATED COUNT: 14.1 % (ref 11.6–15.1)
GFR SERPL CREATININE-BSD FRML MDRD: 93 ML/MIN/1.73SQ M
GLUCOSE SERPL-MCNC: 114 MG/DL (ref 65–140)
GLUCOSE SERPL-MCNC: 123 MG/DL (ref 65–140)
GLUCOSE SERPL-MCNC: 139 MG/DL (ref 65–140)
GLUCOSE SERPL-MCNC: 150 MG/DL (ref 65–140)
GLUCOSE SERPL-MCNC: 236 MG/DL (ref 65–140)
HCT VFR BLD AUTO: 34.4 % (ref 36.5–49.3)
HCT VFR BLD AUTO: 34.8 % (ref 36.5–49.3)
HCT VFR BLD AUTO: 36.6 % (ref 36.5–49.3)
HGB BLD-MCNC: 11.4 G/DL (ref 12–17)
HGB BLD-MCNC: 11.7 G/DL (ref 12–17)
HGB BLD-MCNC: 12.3 G/DL (ref 12–17)
MCH RBC QN AUTO: 31 PG (ref 26.8–34.3)
MCHC RBC AUTO-ENTMCNC: 33.6 G/DL (ref 31.4–37.4)
MCV RBC AUTO: 92 FL (ref 82–98)
PLATELET # BLD AUTO: 226 THOUSANDS/UL (ref 149–390)
PMV BLD AUTO: 10.1 FL (ref 8.9–12.7)
POTASSIUM SERPL-SCNC: 5 MMOL/L (ref 3.5–5.3)
RBC # BLD AUTO: 3.78 MILLION/UL (ref 3.88–5.62)
SODIUM SERPL-SCNC: 136 MMOL/L (ref 136–145)
WBC # BLD AUTO: 9.69 THOUSAND/UL (ref 4.31–10.16)

## 2017-11-10 PROCEDURE — 88342 IMHCHEM/IMCYTCHM 1ST ANTB: CPT | Performed by: INTERNAL MEDICINE

## 2017-11-10 PROCEDURE — 85014 HEMATOCRIT: CPT | Performed by: HOSPITALIST

## 2017-11-10 PROCEDURE — C9113 INJ PANTOPRAZOLE SODIUM, VIA: HCPCS | Performed by: HOSPITALIST

## 2017-11-10 PROCEDURE — 88341 IMHCHEM/IMCYTCHM EA ADD ANTB: CPT | Performed by: INTERNAL MEDICINE

## 2017-11-10 PROCEDURE — 85018 HEMOGLOBIN: CPT | Performed by: HOSPITALIST

## 2017-11-10 PROCEDURE — 88305 TISSUE EXAM BY PATHOLOGIST: CPT | Performed by: INTERNAL MEDICINE

## 2017-11-10 PROCEDURE — 85027 COMPLETE CBC AUTOMATED: CPT | Performed by: HOSPITALIST

## 2017-11-10 PROCEDURE — 82948 REAGENT STRIP/BLOOD GLUCOSE: CPT

## 2017-11-10 PROCEDURE — 80048 BASIC METABOLIC PNL TOTAL CA: CPT | Performed by: HOSPITALIST

## 2017-11-10 PROCEDURE — 0DB68ZX EXCISION OF STOMACH, VIA NATURAL OR ARTIFICIAL OPENING ENDOSCOPIC, DIAGNOSTIC: ICD-10-PCS | Performed by: INTERNAL MEDICINE

## 2017-11-10 RX ORDER — PROPOFOL 10 MG/ML
INJECTION, EMULSION INTRAVENOUS AS NEEDED
Status: DISCONTINUED | OUTPATIENT
Start: 2017-11-10 | End: 2017-11-10 | Stop reason: SURG

## 2017-11-10 RX ORDER — SODIUM CHLORIDE 9 MG/ML
100 INJECTION, SOLUTION INTRAVENOUS CONTINUOUS
Status: DISCONTINUED | OUTPATIENT
Start: 2017-11-10 | End: 2017-11-11

## 2017-11-10 RX ADMIN — FLUTICASONE PROPIONATE 1 SPRAY: 50 SPRAY, METERED NASAL at 08:36

## 2017-11-10 RX ADMIN — DOCUSATE SODIUM 100 MG: 100 CAPSULE, LIQUID FILLED ORAL at 08:31

## 2017-11-10 RX ADMIN — Medication 1 TABLET: at 08:36

## 2017-11-10 RX ADMIN — Medication 1000 MG: at 08:31

## 2017-11-10 RX ADMIN — VITAMIN D, TAB 1000IU (100/BT) 1000 UNITS: 25 TAB at 08:31

## 2017-11-10 RX ADMIN — AZELASTINE HYDROCHLORIDE 2 SPRAY: 137 SPRAY, METERED NASAL at 08:36

## 2017-11-10 RX ADMIN — PROPOFOL 50 MG: 10 INJECTION, EMULSION INTRAVENOUS at 13:14

## 2017-11-10 RX ADMIN — INSULIN LISPRO 2 UNITS: 100 INJECTION, SOLUTION INTRAVENOUS; SUBCUTANEOUS at 21:20

## 2017-11-10 RX ADMIN — PANTOPRAZOLE SODIUM 40 MG: 40 INJECTION, POWDER, FOR SOLUTION INTRAVENOUS at 21:20

## 2017-11-10 RX ADMIN — PROPOFOL 50 MG: 10 INJECTION, EMULSION INTRAVENOUS at 13:17

## 2017-11-10 RX ADMIN — FINASTERIDE 5 MG: 5 TABLET, FILM COATED ORAL at 08:36

## 2017-11-10 RX ADMIN — SENNOSIDES 8.6 MG: 8.6 TABLET, FILM COATED ORAL at 08:31

## 2017-11-10 RX ADMIN — PANTOPRAZOLE SODIUM 40 MG: 40 INJECTION, POWDER, FOR SOLUTION INTRAVENOUS at 08:33

## 2017-11-10 RX ADMIN — MONTELUKAST SODIUM 10 MG: 10 TABLET, FILM COATED ORAL at 21:20

## 2017-11-10 RX ADMIN — ONDANSETRON 4 MG: 2 INJECTION INTRAMUSCULAR; INTRAVENOUS at 19:06

## 2017-11-10 RX ADMIN — LISINOPRIL: 20 TABLET ORAL at 08:31

## 2017-11-10 RX ADMIN — PROPOFOL 50 MG: 10 INJECTION, EMULSION INTRAVENOUS at 13:20

## 2017-11-10 NOTE — ASSESSMENT & PLAN NOTE
· Patient presents with melena at home with several episodes in the Emergency Room  He denies recent weight loss  He states that he had a normal bowel movement yesterday morning at home and it wasn't until 4PM yesterday that he noticed dark colored stool  · Patient states he had approximately 5 dark loose stools overnight  He complains of feeling like he has abdominal gas and states that his lower abdominal region is tender to touch  · Admission hemoglobin 14 3, now 11 7 this AM  · Will trend CBC's q12 due to bleeding  · Type and screen active   · Patient denies any history of GI bleed   · Last  Colonoscopy was "several years ago" per patient  · Patient to follow with Dr Fozia Mcmillan, Colorectal as an outpatient  · GI consulted, plan for EGD today  Patient made NPO   Nursing aware  · Continue protonix IV q12  · Continue clear liquid diet  · ASA/Meloxicam on hold at this time given GI bleed  · Senna and docusate D/C'ed related to frequent bloody BM's  · Hemodynamically stable

## 2017-11-10 NOTE — CASE MANAGEMENT
Initial Clinical Review    Admission: Date/Time/Statement: Observation 11/9 @ 1954    Orders Placed This Encounter   Procedures    Place in Observation (expected length of stay for this patient is less than two midnights)     Standing Status:   Standing     Number of Occurrences:   1     Order Specific Question:   Admitting Physician     Answer:   Minh Magdaleno [[id number]]     Order Specific Question:   Level of Care     Answer:   Med Surg [[id number]]     ED: Date/Time/Mode of Arrival:   ED Arrival Information     Expected Arrival Acuity Means of Arrival Escorted By Service Admission Type    - 11/9/2017 17:18 Urgent Walk-In Self General Medicine Urgent    Arrival Complaint    blood in stool        Chief Complaint:   Chief Complaint   Patient presents with    Black or Bloody Stool     pt reports episode of bright red diarrhea prior to arrival  denies abd pain  reports 81 mg asa use daily  History of Illness: 81 y o  very pleasant male  with past medical history of allergic rhinitis,OA  on aspirin and meloxicam, DM, HTN who presented to the emergency room with GI bleeding started approximately at 4:00 p m  today  Patient denies any history of GI bleeds  He reports 3 -4 bloody BM, the first  two  were very  dark , the others just  BRBPR ,   associated with mild nonspecific lower abdominal discomfort  No family history of colon cancer or IBD  Patient had a colonoscopy less than 10 years ago with no significant findings  Hemoglobin 14 3 ,WBC 16 3 patient afebrile hemodynamically stable repeated hemoglobin at 3:00 p m  dropped to 12 3  Patient was seen by surgical team in the ER and outpatient workup with Burke Constantino was recommended  ER asked to admit on an observation basis for GI evaluation in a m  Patient states that he did feel some diffuse abdominal pressure and when he went to the bathroom, he noted bright red blood in the toilet bowl  Patient had an additional episode in the emergency department       ED Vital Signs:   ED Triage Vitals   Temperature Pulse Respirations Blood Pressure SpO2   11/09/17 1722 11/09/17 1722 11/09/17 1722 11/09/17 1722 11/09/17 1722   98 1 °F (36 7 °C) 105 18 154/80 97 %      Temp Source Heart Rate Source Patient Position - Orthostatic VS BP Location FiO2 (%)   11/09/17 1722 11/09/17 1915 11/09/17 1915 11/09/17 1915 --   Oral Monitor Lying Right arm       Pain Score       11/09/17 1722       No Pain        Wt Readings from Last 1 Encounters:   11/09/17 76 2 kg (168 lb)     Vital Signs (abnormal): WNL    Abnormal Labs:    Updated: 11/09/17 1927      Protime 15 2 (H) 12 1 - 14 4 seconds     INR 1 19 (H) 0 86 - 1 16       Updated: 11/09/17 1847      Updated: 11/09/17 1847        WBC 16 35 (H) 4 31 - 10 16 Thousand/uL     RBC 4 59 3 88 - 5 62 Million/uL     Hemoglobin 14 3 12 0 - 17 0 g/dL     Hematocrit 42 2 36 5 - 49 3 %        Updated: 11/10/17 0600        WBC 9 69 4 31 - 10 16 Thousand/uL     RBC 3 78 (L) 3 88 - 5 62 Million/uL     Hemoglobin 11 7 (L) 12 0 - 17 0 g/dL     Hematocrit 34 8 (L) 36 5 - 49 3 %      Diagnostic Test Results: No order    ED Treatment:   Medication Administration from 11/09/2017 1717 to 11/09/2017 2124     None        Past Medical/Surgical History:    Active Ambulatory Problems     Diagnosis Date Noted    No Active Ambulatory Problems     Resolved Ambulatory Problems     Diagnosis Date Noted    No Resolved Ambulatory Problems     Past Medical History:   Diagnosis Date    Diabetes mellitus ([facility] [address] )     Hyperlipidemia     Hypertension      Admitting Diagnosis: Blood in stool [K92 1]  Bright red blood per rectum [K62 5]  BRBPR (bright red blood per rectum) [K62 5]    Age/Sex: 81 y o  male    Assessment/Plan:   Hospital Problem List:      Principal Problem:    BRBPR (bright red blood per rectum)  Active Problems:    Leukocytosis    Hypertension    Diabetes 1 5, managed as type 2 (HCC)    Osteoarthritis      Plan for the Primary Problem(s):  · GI bleed with drop in hemoglobin from 14 3---> 12 3  · Will continue to monitor hemoglobin hematocrit and transfuse if needed  ? Clear liquid diet  ? Aspirin and meloxicam temporary held  ? Ppi IV b i d   ? GI consult  ? Leukocytosis unclear source of infection ,patient afebrile, will monitor off antibiotic will proceed with imaging study if recommended by GI     Plan for Additional Problems:   · OA  Pain management , meloxicam temporary held  · Hypertension continue home Rx  · Diabetes  Continue Accu-Cheks insulin sliding scale, sitagliptin and  metformin held     VTE Prophylaxis: Pharmacologic VTE Prophylaxis contraindicated due to GI bleed  / sequential compression device   Code Status: full  POLST: There is no POLST form on file for this patient (pre-hospital)      Admission Orders:  Clear liquid  11/10 GI Lab - EGD  Cardiac monitoring  Gastroenterology cons    Scheduled Meds:   azelastine 2 spray Each Nare Daily   cholecalciferol 1,000 Units Oral Daily   docusate sodium 100 mg Oral BID   finasteride 5 mg Oral Daily   fish oil 1,000 mg Oral Daily   fluticasone 1 spray Nasal Daily   insulin lispro 1-5 Units Subcutaneous HS   insulin lispro 1-6 Units Subcutaneous TID AC   lisinopril-hydrochlorothiazide (PRINZIDE 20/12  5) combo dose  Oral Daily   montelukast 10 mg Oral HS   multivitamin with minerals 1 tablet Oral Daily   pantoprazole 40 mg Intravenous Q12H [address]   senna 1 tablet Oral Daily     Continuous Infusions:   sodium chloride 100 mL/hr Last Rate: 100 mL/hr (11/10/17 0713)     PRN Meds:     acetaminophen    meclizine    ondansetron    oxyCODONE    -------------------------------------------------------------------------------------------------------    11/9 Gastroenterology cons:  82 yo male patient with  chronic use of aspirin and meloxicam, presenting with initial episode of melena and 2 more episodes of possible hematochezia  - Currently hemodynamically stable  - No further bleeding      Plan:  Outpatient f/u with Dr Ivania Guerrero

## 2017-11-10 NOTE — ASSESSMENT & PLAN NOTE
· WBC 16 35 on admission, down to 9 69 today     · Will continue to trend WBC  · Patient afebrile, will continue to trend  · Antibiotics not indicated at this time

## 2017-11-10 NOTE — PROGRESS NOTES
Progress Note - Julia Martinez 81 y o  male MRN: [id number]    Unit/Bed#: CW2 218-01 Encounter: [id number]        BRBPR (bright red blood per rectum)   Assessment & Plan    · Patient presents with melena at home with several episodes in the Emergency Room  He denies recent weight loss  He states that he had a normal bowel movement yesterday morning at home and it wasn't until 4PM yesterday that he noticed dark colored stool  · Patient states he had approximately 5 dark loose stools overnight  He complains of feeling like he has abdominal gas and states that his lower abdominal region is tender to touch  · Admission hemoglobin 14 3, now 11 7 this AM  · Will trend CBC's q12 due to bleeding  · Type and screen active   · Patient denies any history of GI bleed   · Last  Colonoscopy was "several years ago" per patient  · Patient to follow with Dr Christopher Conklin, Colorectal as an outpatient  · GI consulted, plan for EGD today  Patient made NPO  Nursing aware  · Continue protonix IV q12  · Continue clear liquid diet  · ASA/Meloxicam on hold at this time given GI bleed  · Senna and docusate D/C'ed related to frequent bloody BM's  · Hemodynamically stable           Diabetes 1 5, managed as type 2 (HCC)   Assessment & Plan    · Takes Januvia and metformin at home, on hold  · Now on SSI, BG well controlled         Hypertension   Assessment & Plan    · BP well controlled during this visit  · Continue prinivil at this time        Leukocytosis   Assessment & Plan    · WBC 16 35 on admission, down to 9 69 today  · Will continue to trend WBC  · Patient afebrile, will continue to trend  · Antibiotics not indicated at this time                VTE Pharmacologic Prophylaxis:   Pharmacologic: not indicated due to GI bleed  Mechanical VTE Prophylaxis in Place: Yes    Patient Centered Rounds: I have performed bedside rounds with nursing staff today      Discussions with Specialists or Other Care Team Provider: nursing, GI    Education and Discussions with Family / Patient: Patient, wife    Time Spent for Care: 30 minutes  More than 50% of total time spent on counseling and coordination of care as described above  Current Length of Stay: 0 day(s)    Current Patient Status: Observation   Certification Statement: The patient will continue to require additional inpatient hospital stay due to needs GI consult, continued blood in stool, monitoring CBC pt may require colonoscopy tomorrow if ongoing blood stool from rectum per gi will need to stay additional night     Discharge Plan: Discharge home when medically stable    Code Status: Level 1 - Full Code      Subjective:   Patient states that he has had approximately 5 dark bowel movements overnight  He states that his abdomen feels "like I have a lot of gas" and he is nauseous at times  He denies SOB, diaphoresis, chills  Per nursing fairly large bm from rectum  Nervous going down for egd now     Objective:     Vitals:   Temp (24hrs), Av 4 °F (36 9 °C), Min:97 8 °F (36 6 °C), Max:99 °F (37 2 °C)    HR:  [] 81  Resp:  [16-18] 18  BP: (102-154)/(58-83) 121/58  SpO2:  [95 %-98 %] 95 %  Body mass index is 27 96 kg/m²  Input and Output Summary (last 24 hours): Intake/Output Summary (Last 24 hours) at 11/10/17 1154  Last data filed at 11/10/17 0715   Gross per 24 hour   Intake              240 ml   Output                0 ml   Net              240 ml       Physical Exam:     Physical Exam   Constitutional: He is oriented to person, place, and time  He appears well-developed and well-nourished  No distress  HENT:   Head: Normocephalic and atraumatic  Right Ear: External ear normal    Left Ear: External ear normal    Eyes: Conjunctivae are normal  Pupils are equal, round, and reactive to light  Right eye exhibits no discharge  Left eye exhibits no discharge  No scleral icterus  Neck: Normal range of motion  No JVD present  No tracheal deviation present  No thyromegaly present  Cardiovascular: Normal rate, regular rhythm and normal heart sounds  Exam reveals no gallop and no friction rub  No murmur heard  Pulmonary/Chest: Effort normal and breath sounds normal  No stridor  No respiratory distress  He has no wheezes  He has no rales  Abdominal: Soft  Bowel sounds are normal  He exhibits no distension  There is no hepatosplenomegaly  There is tenderness  There is guarding  Musculoskeletal: Normal range of motion  He exhibits no edema, tenderness or deformity  Neurological: He is alert and oriented to person, place, and time  He displays normal reflexes  No cranial nerve deficit  Coordination normal    Skin: Skin is warm and dry  No rash noted  He is not diaphoretic  No erythema  No pallor  Psychiatric: He has a normal mood and affect  His behavior is normal        Additional Data:     Labs:      Results from last 7 days  Lab Units 11/10/17  0600  11/09/17  1833   WBC Thousand/uL 9 69  --  16 35*   HEMOGLOBIN g/dL 11 7*  < > 14 3   HEMATOCRIT % 34 8*  < > 42 2   PLATELETS Thousands/uL 226  --  273   NEUTROS PCT %  --   --  71   LYMPHS PCT %  --   --  14   MONOS PCT %  --   --  7   EOS PCT %  --   --  7*   < > = values in this interval not displayed  Results from last 7 days  Lab Units 11/10/17  0453 11/09/17  1937   SODIUM mmol/L 136 138   POTASSIUM mmol/L 5 0 4 0   CHLORIDE mmol/L 105 104   CO2 mmol/L 25 26   BUN mg/dL 14 15   CREATININE mg/dL 0 75 0 82   CALCIUM mg/dL 7 6* 8 1*   TOTAL PROTEIN g/dL  --  6 4   BILIRUBIN TOTAL mg/dL  --  0 39   ALK PHOS U/L  --  89   ALT U/L  --  20   AST U/L  --  11   GLUCOSE RANDOM mg/dL 150* 200*       Results from last 7 days  Lab Units 11/09/17  1833   INR  1 19*       * I Have Reviewed All Lab Data Listed Above  * Additional Pertinent Lab Tests Reviewed:  All Labs Within Last 24 Hours Reviewed    Imaging:    Imaging Reports Reviewed Today Include: reviewed      Recent Cultures (last 7 days):           Last 24 Hours Medication List: azelastine 2 spray Each Nare Daily   cholecalciferol 1,000 Units Oral Daily   finasteride 5 mg Oral Daily   fish oil 1,000 mg Oral Daily   fluticasone 1 spray Nasal Daily   insulin lispro 1-5 Units Subcutaneous HS   insulin lispro 1-6 Units Subcutaneous TID AC   lisinopril-hydrochlorothiazide (PRINZIDE 20/12  5) combo dose  Oral Daily   montelukast 10 mg Oral HS   multivitamin-minerals 1 tablet Oral Daily   pantoprazole 40 mg Intravenous Q12H [address]        Today, Patient Was Seen By: INOCENCIO Dangelo    ** Please Note: Dictation voice to text software may have been used in the creation of this document   **

## 2017-11-10 NOTE — SOCIAL WORK
CM met with patient,explaiend CM role with introduction  Patient lives with wife in [place], independent PTA, including driving  Pateint has a cane and RW  Patient has no p[rior HHC or inpatient rehab experience  Patient has a prescription plan and uses [address] in [place]  PCP is Dr Luz Andrade  Patient has no POA, denies MH or drug and alcohol treatment  Primary  is patient's wife [employer] [place] [phone]  CM reviewed d/c planning process including the following: identifying help at home, patient preference for d/c planning needs, [facility] Meds to Bed program, availability of treatment team to discuss questions or concerns patient and/or family may have regarding understanding medications and recognizing signs and symptoms once discharged  CM also encouraged patient to follow up with all recommended appointments after discharge  Patient advised of importance for patient and family to participate in managing patients medical well being  CM will follow for discharge needs

## 2017-11-10 NOTE — CONSULTS
Consultation - [facility]  Freddy Rasmussen 81 y o  male MRN: [id number]  Unit/Bed#: 2 218-01 Encounter: [id number]        ASSESSMENT/PLAN:   Melena  GI bleeding    He was admitted with what sounds like melena  He is on ASA & meloxicam & could have a bleeding ulcer vs AVM vs Dieulafoy's  He dis have clears this AM but that was at 730 & therefore he should be ok for EGD this afternoon  -NPO now   -EGD this afternoon  -Continue Protonix IV BID  -If negative will need colonoscopy    Consults    Reason for Consult / Principal Problem: Melena    HPI: Freddy Rasmussen is a 81y o  year old male with a PMH of diabetes, HTN, who was admitted with rectal bleeding  He states yesterday afternoon he passed a dark BM & the water was red  He has some associated lower abd cramping  He had 3 episodes at home, 2 in ER & 4 more since then  His Hbg on admission was 14->12 3->11 7  He states he is on ASA 81mg & meloxicam daily for his arthritis  He also had some nausea but no vomiting  He denies heartburn or reflux  He has never had bleeding before  He has never had an EGD  He states he did have a colonoscopy in the past but it has been many yrs  Review of Systems: as per HPI  Review of Systems   All other systems reviewed and are negative  Historical Information   Past Medical History:   Diagnosis Date    Diabetes mellitus ([facility] [address] )     Hyperlipidemia     Hypertension      History reviewed  No pertinent surgical history  Social History   History   Alcohol Use    Yes     Comment: social     History   Drug Use No     History   Smoking Status    Never Smoker   Smokeless Tobacco    Never Used     History reviewed  No pertinent family history      Meds/Allergies     Prescriptions Prior to Admission   Medication    amLODIPine (NORVASC) 10 mg tablet    Azelastine HCl 0 15 % SOLN    cholecalciferol (VITAMIN D3) 1,000 units tablet    finasteride (PROSCAR) 5 mg tablet    fluticasone (FLONASE) 50 mcg/act nasal spray    glimepiride (AMARYL) 2 mg tablet    glucose blood test strip    hypromellose (ARTIFICIAL TEARS) 0 4 % SOLN    lisinopril-hydrochlorothiazide (PRINZIDE,ZESTORETIC) 20-12 5 MG per tablet    meclizine (ANTIVERT) 32 MG tablet    meloxicam (MOBIC) 7 5 mg tablet    metFORMIN (GLUCOPHAGE) 1000 MG tablet    montelukast (SINGULAIR) 10 mg tablet    Multiple Vitamins-Minerals (MULTIVITAMIN WITH MINERALS) tablet    Omega-3 Fatty Acids (FISH OIL) 1,000 mg    sitaGLIPtin (JANUVIA) 25 mg tablet    tadalafil (CIALIS) 20 MG tablet     Current Facility-Administered Medications   Medication Dose Route Frequency    acetaminophen (TYLENOL) tablet 650 mg  650 mg Oral Q6H PRN    azelastine (ASTELIN) 0 1 % nasal spray 2 spray  2 spray Each Nare Daily    cholecalciferol (VITAMIN D3) tablet 1,000 Units  1,000 Units Oral Daily    finasteride (PROSCAR) tablet 5 mg  5 mg Oral Daily    fish oil capsule 1,000 mg  1,000 mg Oral Daily    fluticasone (FLONASE) 50 mcg/act nasal spray 1 spray  1 spray Nasal Daily    insulin lispro (HumaLOG) 100 units/mL subcutaneous injection 1-5 Units  1-5 Units Subcutaneous HS    insulin lispro (HumaLOG) 100 units/mL subcutaneous injection 1-6 Units  1-6 Units Subcutaneous TID AC    lisinopril-hydrochlorothiazide (PRINZIDE 20/12  5) combo dose   Oral Daily    meclizine (ANTIVERT) tablet 25 mg  25 mg Oral TID PRN    montelukast (SINGULAIR) tablet 10 mg  10 mg Oral HS    multivitamin-minerals (CENTRUM) tablet 1 tablet  1 tablet Oral Daily    ondansetron (ZOFRAN) injection 4 mg  4 mg Intravenous Q6H PRN    oxyCODONE (ROXICODONE) IR tablet 2 5 mg  2 5 mg Oral Q4H PRN    oxyCODONE (ROXICODONE) IR tablet 5 mg  5 mg Oral Q4H PRN    pantoprazole (PROTONIX) injection 40 mg  40 mg Intravenous Q12H [facility]    sodium chloride 0 9 % infusion  100 mL/hr Intravenous Continuous       Allergies   Allergen Reactions    Iodinated Diagnostic Agents        Objective     Blood pressure 121/58, pulse 81, temperature 99 °F (37 2 °C), temperature source Oral, resp  rate 18, height 5' 5" (1 651 m), weight 76 2 kg (168 lb), SpO2 95 %  Intake/Output Summary (Last 24 hours) at 11/10/17 1157  Last data filed at 11/10/17 0715   Gross per 24 hour   Intake              240 ml   Output                0 ml   Net              240 ml       PHYSICAL EXAM     Physical Exam   Constitutional: He is oriented to person, place, and time  He appears well-developed and well-nourished  No distress  HENT:   Head: Normocephalic and atraumatic  Eyes: Conjunctivae are normal    Neck: Normal range of motion  Cardiovascular: Normal rate and regular rhythm  Pulmonary/Chest: Effort normal and breath sounds normal    Abdominal: Soft  Bowel sounds are normal  He exhibits no distension  There is no tenderness  Musculoskeletal: Normal range of motion  Neurological: He is alert and oriented to person, place, and time  Skin: Skin is warm and dry  Psychiatric: He has a normal mood and affect   His behavior is normal        Lab Results:   CBC: Lab Results   Component Value Date    WBC 9 69 11/10/2017    HGB 11 7 (L) 11/10/2017    HCT 34 8 (L) 11/10/2017    MCV 92 11/10/2017     11/10/2017    MCH 31 0 11/10/2017    MCHC 33 6 11/10/2017    RDW 14 1 11/10/2017    MPV 10 1 11/10/2017    NRBC 0 11/09/2017   ,   CMP: Lab Results   Component Value Date     11/10/2017    K 5 0 11/10/2017     11/10/2017    CO2 25 11/10/2017    ANIONGAP 6 11/10/2017    BUN 14 11/10/2017    CREATININE 0 75 11/10/2017    GLUCOSE 150 (H) 11/10/2017    CALCIUM 7 6 (L) 11/10/2017    AST 11 11/09/2017    ALT 20 11/09/2017    ALKPHOS 89 11/09/2017    PROT 6 4 11/09/2017    ALBUMIN 3 4 (L) 11/09/2017    BILITOT 0 39 11/09/2017    EGFR 93 11/10/2017   ,   Lipase: No results found for: LIPASE,  PT/INR:   Lab Results   Component Value Date    INR 1 19 (H) 11/09/2017   ,   Imaging Studies: none

## 2017-11-10 NOTE — ED PROVIDER NOTES
History  Chief Complaint   Patient presents with    Black or Bloody Stool     pt reports episode of bright red diarrhea prior to arrival  denies abd pain  reports 81 mg asa use daily  This is a 81year old male with a history of diverticulosis who presents to the ED this evening with BRBPR  Patient states that he felt normal all day until this evening when he noticed a "gurgling" or a "rumbling" in his stomach  The patient went to the bathroom and had a bowel movement that was "all blood " Patient states that the blood was bright red and he has not had any black or tarry stools leading up to today  Patient states that his last colonoscopy was about three years ago and revealed the diverticulosis  He takes meloxicam 7 5mg and aspirin 81mg daily  No other blood thinners  Prior to Admission Medications   Prescriptions Last Dose Informant Patient Reported? Taking?    Azelastine HCl 0 15 % SOLN 2017 at Unknown time  Yes Yes   Si sprays into each nostril daily   Multiple Vitamins-Minerals (MULTIVITAMIN WITH MINERALS) tablet 2017 at Unknown time  Yes Yes   Sig: Take 1 tablet by mouth daily   Omega-3 Fatty Acids (FISH OIL) 1,000 mg 2017 at Unknown time  Yes Yes   Sig: Take 1,000 mg by mouth daily   amLODIPine (NORVASC) 10 mg tablet 2017 at Unknown time  Yes Yes   Sig: Take 10 mg by mouth daily   cholecalciferol (VITAMIN D3) 1,000 units tablet 2017 at Unknown time  Yes Yes   Sig: Take 1,000 Units by mouth daily   finasteride (PROSCAR) 5 mg tablet 2017 at Unknown time  Yes Yes   Sig: Take 5 mg by mouth daily   fluticasone (FLONASE) 50 mcg/act nasal spray 2017 at Unknown time  Yes Yes   Si spray into each nostril daily   glimepiride (AMARYL) 2 mg tablet 2017 at Unknown time  Yes Yes   Sig: Take 2 mg by mouth every morning before breakfast   glucose blood test strip 2017 at Unknown time  Yes Yes   Si each by Other route as needed Use as instructed hypromellose (ARTIFICIAL TEARS) 0 4 % SOLN 2017 at Unknown time  Yes Yes   Si drop 4 (four) times a day as needed for dry eyes   lisinopril-hydrochlorothiazide (PRINZIDE,ZESTORETIC) 20-12 5 MG per tablet 2017 at Unknown time  Yes Yes   Sig: Take 1 tablet by mouth daily   meclizine (ANTIVERT) 32 MG tablet 2017 at Unknown time  Yes Yes   Sig: Take 25 mg by mouth 3 (three) times a day as needed for dizziness   meloxicam (MOBIC) 7 5 mg tablet 2017 at Unknown time  Yes Yes   Sig: Take 7 5 mg by mouth daily   metFORMIN (GLUCOPHAGE) 1000 MG tablet 2017 at Unknown time  Yes Yes   Sig: Take 1,000 mg by mouth 2 (two) times a day with meals   montelukast (SINGULAIR) 10 mg tablet 2017 at Unknown time  Yes Yes   Sig: Take 10 mg by mouth daily at bedtime   sitaGLIPtin (JANUVIA) 25 mg tablet 2017 at Unknown time  Yes Yes   Sig: Take 25 mg by mouth daily   tadalafil (CIALIS) 20 MG tablet 2017 at Unknown time  Yes Yes   Sig: Take 20 mg by mouth daily as needed for erectile dysfunction      Facility-Administered Medications: None       Past Medical History:   Diagnosis Date    Arthritis     Diabetes mellitus ([address] )     History of BPH     Hyperlipidemia     Hypertension     Rectal bleeding        Past Surgical History:   Procedure Laterality Date    COLONOSCOPY      ESOPHAGOGASTRODUODENOSCOPY N/A 11/10/2017    Procedure: ESOPHAGOGASTRODUODENOSCOPY (EGD); Surgeon: Aisha Nielsen MD;  Location: [facility]; Service: Gastroenterology    TONSILLECTOMY         History reviewed  No pertinent family history  I have reviewed and agree with the history as documented  Social History   Substance Use Topics    Smoking status: Never Smoker    Smokeless tobacco: Never Used    Alcohol use Yes      Comment: social        Review of Systems   Constitutional: Negative for chills, diaphoresis and fever  HENT: Negative for congestion, rhinorrhea, sinus pressure and sore throat      Eyes: Negative for visual disturbance  Respiratory: Negative for cough, chest tightness and shortness of breath  Cardiovascular: Negative for chest pain  Gastrointestinal: Positive for abdominal pain, blood in stool and diarrhea  Negative for constipation, nausea and vomiting  Genitourinary: Negative for dysuria, frequency, hematuria and urgency  Musculoskeletal: Negative for arthralgias and myalgias  Skin: Negative for color change and rash  Neurological: Negative for dizziness, numbness and headaches  Physical Exam  ED Triage Vitals   Temperature Pulse Respirations Blood Pressure SpO2   11/09/17 1722 11/09/17 1722 11/09/17 1722 11/09/17 1722 11/09/17 1722   98 1 °F (36 7 °C) 105 18 154/80 97 %      Temp Source Heart Rate Source Patient Position - Orthostatic VS BP Location FiO2 (%)   11/09/17 1722 11/09/17 1915 11/09/17 1915 11/09/17 1915 --   Oral Monitor Lying Right arm       Pain Score       11/09/17 1722       No Pain           Orthostatic Vital Signs  Vitals:    11/13/17 1028 11/13/17 1250 11/13/17 1305 11/13/17 1500   BP: 109/65 90/51 91/54 114/61   Pulse: 69 76 67 76   Patient Position - Orthostatic VS:    Sitting       Physical Exam   Constitutional: He is oriented to person, place, and time  He appears well-developed and well-nourished  No distress  HENT:   Head: Normocephalic and atraumatic  Eyes: Conjunctivae are normal  Pupils are equal, round, and reactive to light  Cardiovascular: Normal rate, regular rhythm and normal heart sounds  Exam reveals no gallop and no friction rub  No murmur heard  Pulmonary/Chest: Effort normal and breath sounds normal  No respiratory distress  He has no wheezes  He has no rales  Abdominal: Soft  Bowel sounds are normal  He exhibits no distension  There is no tenderness  There is no guarding  Genitourinary: Prostate normal  Rectal exam shows guaiac positive stool (gross blood on exam )     Genitourinary Comments: PIYUSH done with Dr Aziza Holland and the patient's wife in the room as a standby  Musculoskeletal: Normal range of motion  Neurological: He is alert and oriented to person, place, and time  Skin: Skin is warm and dry  Psychiatric: He has a normal mood and affect  His behavior is normal    Nursing note and vitals reviewed  ED Medications  Medications   polyethylene glycol (GOLYTELY) bowel prep 4,000 mL (4,000 mL Oral Given 11/12/17 1504)   bisacodyl (DULCOLAX) EC tablet 10 mg (10 mg Oral Given 11/12/17 1510)   magnesium citrate (CITROMA) oral solution 296 mL (296 mL Oral Given 11/13/17 0606)       Diagnostic Studies  Results Reviewed     Procedure Component Value Units Date/Time    Comprehensive metabolic panel [[id number]]  (Abnormal) Collected:  11/09/17 1937    Lab Status:  Final result Specimen:  Blood from Arm, Right Updated:  11/09/17 2001     Sodium 138 mmol/L      Potassium 4 0 mmol/L      Chloride 104 mmol/L      CO2 26 mmol/L      Anion Gap 8 mmol/L      BUN 15 mg/dL      Creatinine 0 82 mg/dL      Glucose 200 (H) mg/dL      Calcium 8 1 (L) mg/dL      AST 11 U/L      ALT 20 U/L      Alkaline Phosphatase 89 U/L      Total Protein 6 4 g/dL      Albumin 3 4 (L) g/dL      Total Bilirubin 0 39 mg/dL      eGFR 90 ml/min/1 73sq m     Narrative:         National Kidney Disease Education Program recommendations are as follows:  GFR calculation is accurate only with a steady state creatinine  Chronic Kidney disease less than 60 ml/min/1 73 sq  meters  Kidney failure less than 15 ml/min/1 73 sq  meters      Aspirin Platelet Inhibitor [[id number]] Collected:  11/09/17 1853    Lab Status:  Final result Specimen:  Blood from Arm, Left Updated:  11/09/17 1944     Aspirin Response Test 434 ARU     Protime-INR [[id number]]  (Abnormal) Collected:  11/09/17 1833    Lab Status:  Final result Specimen:  Blood from Arm, Right Updated:  11/09/17 1927     Protime 15 2 (H) seconds      INR 1 19 (H)    APTT [[id number]]  (Normal) Collected:  11/09/17 1833    Lab Status:  Final result Specimen:  Blood from Arm, Right Updated:  11/09/17 1927     PTT 30 seconds     Narrative: Therapeutic Heparin Range = 60-90 seconds    CBC and differential [[id number]]  (Abnormal) Collected:  11/09/17 1833    Lab Status:  Final result Specimen:  Blood from Arm, Right Updated:  11/09/17 1847     WBC 16 35 (H) Thousand/uL      RBC 4 59 Million/uL      Hemoglobin 14 3 g/dL      Hematocrit 42 2 %      MCV 92 fL      MCH 31 2 pg      MCHC 33 9 g/dL      RDW 14 2 %      MPV 10 6 fL      Platelets 144 Thousands/uL      nRBC 0 /100 WBCs      Neutrophils Relative 71 %      Lymphocytes Relative 14 %      Monocytes Relative 7 %      Eosinophils Relative 7 (H) %      Basophils Relative 1 %      Neutrophils Absolute 11 68 (H) Thousands/µL      Lymphocytes Absolute 2 20 Thousands/µL      Monocytes Absolute 1 11 Thousand/µL      Eosinophils Absolute 1 18 (H) Thousand/µL      Basophils Absolute 0 12 (H) Thousands/µL                  No orders to display         Procedures  Procedures      Phone Consults  ED Phone Contact    ED Course  ED Course                                MDM  Number of Diagnoses or Management Options  Bright red blood per rectum:   Diagnosis management comments: Colorectal surgery and GI consulted  The patient was clinically stable the entire time he was in the ED other than some tachycardia  We were unable to do any CT scans for further eval as the patient is allergic to the contrast dye  It was agreed that the patient should be admitted and that GI would follow up with him in the morning  There was no surgical indication at this time  Spoke with NATHALY who agreed to admit the patient as an observation       CritCare Time    Disposition  Final diagnoses:   Bright red blood per rectum     Time reflects when diagnosis was documented in both MDM as applicable and the Disposition within this note     Time User Action Codes Description Comment    11/9/2017  7:53 PM Hattie Little [K62 5] Bright red blood per rectum     11/9/2017  9:08 PM Cy Fire Add [K62 5] BRBPR (bright red blood per rectum)     11/9/2017  9:08 PM Cy Fire Modify [K62 5] BRBPR (bright red blood per rectum)     11/10/2017 11:10 AM Mai Ege Add [K92 1] Melena     11/10/2017  1:21 PM Flory Clunes Modify [K92 1] Melena       ED Disposition     ED Disposition Condition Comment    Admit  Case was discussed with NATHALY and the patient's admission status was agreed to be Admission Status: observation status to the service of Dr Eddi Capps  Follow-up Information     Follow up With Specialties Details Why Contact Info    Kenneth Kwan MD Colon and Rectal Surgery Follow up Please call tomorrow to schedule an appointment with the office for [facility] [facility] Follow up please call to schedule follow up in 1 week [address]      Leonel Roque MD Gastroenterology Follow up please call and schedule follow up appointment as soon as possible [address] [facility] [address] [facility] [address]  [phone]          Discharge Medication List as of 11/13/2017  4:12 PM      CONTINUE these medications which have NOT CHANGED    Details   amLODIPine (NORVASC) 10 mg tablet Take 10 mg by mouth daily, Historical Med      Azelastine HCl 0 15 % SOLN 2 sprays into each nostril daily, Historical Med      cholecalciferol (VITAMIN D3) 1,000 units tablet Take 1,000 Units by mouth daily, Historical Med      finasteride (PROSCAR) 5 mg tablet Take 5 mg by mouth daily, Historical Med      fluticasone (FLONASE) 50 mcg/act nasal spray 1 spray into each nostril daily, Historical Med      glimepiride (AMARYL) 2 mg tablet Take 2 mg by mouth every morning before breakfast, Historical Med      glucose blood test strip 1 each by Other route as needed Use as instructed, Historical Med hypromellose (ARTIFICIAL TEARS) 0 4 % SOLN 1 drop 4 (four) times a day as needed for dry eyes, Historical Med      lisinopril-hydrochlorothiazide (PRINZIDE,ZESTORETIC) 20-12 5 MG per tablet Take 1 tablet by mouth daily, Historical Med      meclizine (ANTIVERT) 32 MG tablet Take 25 mg by mouth 3 (three) times a day as needed for dizziness, Historical Med      metFORMIN (GLUCOPHAGE) 1000 MG tablet Take 1,000 mg by mouth 2 (two) times a day with meals, Historical Med      montelukast (SINGULAIR) 10 mg tablet Take 10 mg by mouth daily at bedtime, Historical Med      Multiple Vitamins-Minerals (MULTIVITAMIN WITH MINERALS) tablet Take 1 tablet by mouth daily, Historical Med      Omega-3 Fatty Acids (FISH OIL) 1,000 mg Take 1,000 mg by mouth daily, Historical Med      sitaGLIPtin (JANUVIA) 25 mg tablet Take 25 mg by mouth daily, Historical Med      tadalafil (CIALIS) 20 MG tablet Take 20 mg by mouth daily as needed for erectile dysfunction, Historical Med         STOP taking these medications       meloxicam (MOBIC) 7 5 mg tablet Comments:   Reason for Stopping:               Outpatient Discharge Orders  Discharge Diet     Activity as tolerated     Call provider for: active or persistent bleeding         ED Provider  Attending physically available and evaluated Kang Montague  MELODY managed the patient along with the ED Attending      Electronically Signed by         Susanne Ashton MD  Resident  11/13/17 Khushbu Childress MD  Resident  11/13/17 5723

## 2017-11-10 NOTE — ANESTHESIA POSTPROCEDURE EVALUATION
Post-Op Assessment Note      CV Status:  Stable    Mental Status:  Alert and awake    Hydration Status:  Euvolemic    PONV Controlled:  Controlled    Airway Patency:  Patent    Post Op Vitals Reviewed:  Yes              /60 (11/10/17 1326)    Temp      Pulse 96 (11/10/17 1326)   Resp 18 (11/10/17 1326)    SpO2 94 % (11/10/17 1326)

## 2017-11-10 NOTE — H&P
History and Physical - [facility] Internal Medicine    Patient Information: Osito Gutierrez 81 y o  male MRN: [id number]  Unit/Bed#: ED 05 Encounter: [id number]  Admitting Physician: David Bar MD  PCP: Luz Andrade DO  Date of Admission:  11/09/17    Assessment/Plan:    Hospital Problem List:     Principal Problem:    BRBPR (bright red blood per rectum)  Active Problems:    Leukocytosis    Hypertension    Diabetes 1 5, managed as type 2 ([facility] [address] )    Osteoarthritis      Plan for the Primary Problem(s):  · GI bleed with drop in hemoglobin from 14 3---> 12 3  · Will continue to monitor hemoglobin hematocrit and transfuse if needed  · Clear liquid diet  · Aspirin and meloxicam temporary held  · Ppi IV b i d   · GI consult  · Leukocytosis unclear source of infection ,patient afebrile, will monitor off antibiotic will proceed with imaging study if recommended by GI    Plan for Additional Problems:   · OA  Pain management , meloxicam temporary held  · Hypertension continue home Rx  · Diabetes  Continue Accu-Cheks insulin sliding scale, sitagliptin and  metformin held    VTE Prophylaxis: Pharmacologic VTE Prophylaxis contraindicated due to GI bleed  / sequential compression device   Code Status: full  POLST: There is no POLST form on file for this patient (pre-hospital)    Anticipated Length of Stay:  Patient will be admitted on an Observation basis with an anticipated length of stay of  <2 midnights  Justification for Hospital Stay:  GI consult    Total Time for Visit, including Counseling / Coordination of Care: 45 minutes  Greater than 50% of this total time spent on direct patient counseling and coordination of care      Chief Complaint:   Rectal bleeding    History of Present Illness:    Osito Gutierrez is a 81 y o  very pleasant    male  with past medical history of allergic rhinitis,OA  on aspirin and meloxicam, DM, HTN who presented to the emergency room   with GI bleeding started approximately at 4:00 p m  today  Patient denies any history of GI bleeds  He reports 3 -4 bloody BM, the first  two  were very  dark , the others just  BRBPR ,   associated with mild nonspecific lower abdominal discomfort  He denies associated clots, nausea, vomiting, hematemesis, a weight loss, fever ,chills, epigastric pain  No dizziness chest pain shortness of breath  No family history of colon cancer or IBD  Patient had a colonoscopy less than 10 years ago with no significant findings  Hemoglobin 14 3 ,WBC 16 3 patient afebrile hemodynamically stable repeated hemoglobin at 3:00 p m  dropped to 12 3  Patient was seen by surgical team in the ER and outpatient workup with Swati Sheets was recommended  ER asked to admit on an observation basis for GI evaluation in a m  Review of Systems:    Review of Systems   Gastrointestinal: Positive for blood in stool  Past Medical and Surgical History:     Past Medical History:   Diagnosis Date    Diabetes mellitus ([facility] [address] )     Hyperlipidemia     Hypertension        History reviewed  No pertinent surgical history  Meds/Allergies:    Prior to Admission medications    Medication Sig Start Date End Date Taking?  Authorizing Provider   amLODIPine (NORVASC) 10 mg tablet Take 10 mg by mouth daily   Yes Historical Provider, MD   Azelastine HCl 0 15 % SOLN 2 sprays into each nostril daily   Yes Historical Provider, MD   cholecalciferol (VITAMIN D3) 1,000 units tablet Take 1,000 Units by mouth daily   Yes Historical Provider, MD   finasteride (PROSCAR) 5 mg tablet Take 5 mg by mouth daily   Yes Historical Provider, MD   fluticasone (FLONASE) 50 mcg/act nasal spray 1 spray into each nostril daily   Yes Historical Provider, MD   glimepiride (AMARYL) 2 mg tablet Take 2 mg by mouth every morning before breakfast   Yes Historical Provider, MD   glucose blood test strip 1 each by Other route as needed Use as instructed   Yes Historical Provider, MD   hypromellose (ARTIFICIAL TEARS) 0 4 % SOLN 1 drop 4 (four) times a day as needed for dry eyes   Yes Historical Provider, MD   lisinopril-hydrochlorothiazide (PRINZIDE,ZESTORETIC) 20-12 5 MG per tablet Take 1 tablet by mouth daily   Yes Historical Provider, MD   meclizine (ANTIVERT) 32 MG tablet Take 25 mg by mouth 3 (three) times a day as needed for dizziness   Yes Historical Provider, MD   meloxicam (MOBIC) 7 5 mg tablet Take 7 5 mg by mouth daily   Yes Historical Provider, MD   metFORMIN (GLUCOPHAGE) 1000 MG tablet Take 1,000 mg by mouth 2 (two) times a day with meals   Yes Historical Provider, MD   montelukast (SINGULAIR) 10 mg tablet Take 10 mg by mouth daily at bedtime   Yes Historical Provider, MD   Multiple Vitamins-Minerals (MULTIVITAMIN WITH MINERALS) tablet Take 1 tablet by mouth daily   Yes Historical Provider, MD   Omega-3 Fatty Acids (FISH OIL) 1,000 mg Take 1,000 mg by mouth daily   Yes Historical Provider, MD   sitaGLIPtin (JANUVIA) 25 mg tablet Take 25 mg by mouth daily   Yes Historical Provider, MD   tadalafil (CIALIS) 20 MG tablet Take 20 mg by mouth daily as needed for erectile dysfunction   Yes Historical Provider, MD     I have reviewed home medications with a medical source (PCP, Pharmacy, other)  Allergies:    Allergies   Allergen Reactions    Iodinated Diagnostic Agents        Social History:     Marital Status: /Civil Union   Occupation:  None  Patient Pre-hospital Living Situation: home  Patient Pre-hospital Level of Mobility: reg  Patient Pre-hospital Diet Restrictions: reg  Substance Use History:   History   Alcohol Use    Yes     Comment: social     History   Smoking Status    Never Smoker   Smokeless Tobacco    Never Used     History   Drug Use No       Family History:    non-contributory    Physical Exam:     Vitals:   Blood Pressure: 124/83 (11/09/17 2046)  Pulse: 88 (11/09/17 2046)  Temperature: 98 1 °F (36 7 °C) (11/09/17 1722)  Temp Source: Oral (11/09/17 1722)  Respirations: 16 (11/09/17 2046)  Weight - Scale: 76 2 kg (168 lb) (11/09/17 1722)  SpO2: 95 % (11/09/17 2046)    Physical Exam   Constitutional: He is oriented to person, place, and time  He appears well-developed  HENT:   Head: Normocephalic  Eyes: Pupils are equal, round, and reactive to light  Neck: Normal range of motion  Cardiovascular: Regular rhythm  Pulmonary/Chest: No respiratory distress  Abdominal: He exhibits no distension  There is no tenderness  Musculoskeletal: He exhibits no edema  Neurological: He is alert and oriented to person, place, and time  Skin: Skin is warm  Psychiatric: He has a normal mood and affect  Additional Data:     Lab Results: I have personally reviewed pertinent reports  Results from last 7 days  Lab Units 11/09/17  1833   WBC Thousand/uL 16 35*   HEMOGLOBIN g/dL 14 3   HEMATOCRIT % 42 2   PLATELETS Thousands/uL 273   NEUTROS PCT % 71   LYMPHS PCT % 14   MONOS PCT % 7   EOS PCT % 7*       Results from last 7 days  Lab Units 11/09/17  1937   SODIUM mmol/L 138   POTASSIUM mmol/L 4 0   CHLORIDE mmol/L 104   CO2 mmol/L 26   BUN mg/dL 15   CREATININE mg/dL 0 82   CALCIUM mg/dL 8 1*   TOTAL PROTEIN g/dL 6 4   BILIRUBIN TOTAL mg/dL 0 39   ALK PHOS U/L 89   ALT U/L 20   AST U/L 11   GLUCOSE RANDOM mg/dL 200*       Results from last 7 days  Lab Units 11/09/17  1833   INR  1 19*       Imaging: I have personally reviewed pertinent reports  No results found  EKG, Pathology, and Other Studies Reviewed on Admission:   · EKG: sinus    Allscripts / Epic Records Reviewed: Yes     ** Please Note: This note has been constructed using a voice recognition system   **

## 2017-11-10 NOTE — OP NOTE
**** GI/ENDOSCOPY REPORT ****     PATIENT NAME: RENU CUI - VISIT ID:  Patient ID: [id number]   YOB: 1945     INTRODUCTION: Esophagogastroduodenoscopy - A 81 male patient presents for   an outpatient Esophagogastroduodenoscopy at [address] [place] [address]  INDICATIONS: Melena  CONSENT: The benefits, risks, and alternatives to the procedure were   discussed and informed consent was obtained from the patient  PREPARATION:  EKG, pulse, pulse oximetry and blood pressure were monitored   throughout the procedure  ASA Classification: Class 3 - Patient has severe   systemic disturbance that may or may not be related to the disorder   requiring surgery  MEDICATIONS: MAC anesthesia  PROCEDURE:  The endoscope was passed without difficulty through the mouth   under direct visualization and advanced to the 2nd portion of the   duodenum  The scope was withdrawn and the mucosa was carefully examined  FINDINGS:   Esophagus: The Z line was visualized at 40 cm from the entry   site and appeared slightly irregular  Variation less than 1 cm  Stomach: There was erosion in the fundus  A biopsy was taken from the body of the   stomach  Duodenum: There was evidence of duodenitis in the duodenal bulb  COMPLICATIONS: There were no complications  IMPRESSIONS: Slightly irregular Z line visualized  Erosion found in the   fundus  Duodenitis in the duodenal bulb  RECOMMENDATIONS: Follow-up on the results of the biopsy specimens  ESTIMATED BLOOD LOSS: Insignificant  PATHOLOGY SPECIMENS: Random biopsy taken from the body of the stomach       PROCEDURE CODES:     ICD-9 Codes: 578 1 Blood in stool 535 4 Other specified gastritides 535 60   Duodenitis, without mention of hemorrhage     ICD-10 Codes: K92 1 Melena R19 8 Other specified symptoms and signs   involving the digestive system and abdomen K29 Gastritis and duodenitis     PERFORMED BY: Dr Steve Cordero, M D  on 11/10/2017  Version 1, electronically signed by USAMA Larkin  on 11/10/2017 at   13:29

## 2017-11-10 NOTE — ANESTHESIA PREPROCEDURE EVALUATION
Review of Systems/Medical History  Patient summary reviewed  Chart reviewed  No history of anesthetic complications     Cardiovascular  Exercise tolerance: good,  Hyperlipidemia, Hypertension controlled,    Pulmonary       GI/Hepatic            Endo/Other  Diabetes poorly controlled type 2 , Arthritis     GYN       Hematology   Musculoskeletal       Neurology   Psychology           Physical Exam    Airway    Mallampati score: II  TM Distance: >3 FB  Neck ROM: full     Dental       Cardiovascular      Pulmonary      Other Findings        Anesthesia Plan  ASA Score- 2       Anesthesia Type- IV sedation with anesthesia with ASA Monitors  Additional Monitors:   Airway Plan:           Induction- intravenous  Informed Consent- Anesthetic plan and risks discussed with patient  I personally reviewed this patient with the CRNA  Discussed and agreed on the Anesthesia Plan with the CRNA  Shruthi Ruiz

## 2017-11-10 NOTE — PROGRESS NOTES
Progress Note - General Surgery   Jenchad Calvillo 81 y o  male MRN: [id number]  Unit/Bed#: CW2 218-01 Encounter: [id number]    Assessment:  Pt is a 82 yo male patient with  chronic use of aspirin and meloxicam, presenting with initial episode of melena and 2 more episodes of possible hematochezia    Plan:  Clears  Trend Hg  Defer endoscopy to GI  Feel free to follow up outpt with Dr Harrison Kayser  Primary care per [facility]    Subjective/Objective   Chief Complaint: I had two more black bowel movements last night    Subjective: Hg rechecked overnight went from 14 3 to 12  3  No N/V/F/C  Objective:     Blood pressure 127/68, pulse 81, temperature 97 8 °F (36 6 °C), temperature source Oral, resp  rate 18, height 5' 5" (1 651 m), weight 76 2 kg (168 lb), SpO2 95 %  ,Body mass index is 27 96 kg/m²  No intake or output data in the 24 hours ending 11/10/17 0515    Invasive Devices     Peripheral Intravenous Line            Peripheral IV 11/09/17 Left Antecubital less than 1 day    Peripheral IV 11/09/17 Right Forearm less than 1 day                Physical Exam: NAD  AAOX3  RRR  Normal respiratory rate  Soft, NT, ND    Lab, Imaging and other studies:I have personally reviewed pertinent lab results      VTE Mechanical Prophylaxis: sequential compression device

## 2017-11-10 NOTE — PLAN OF CARE
Problem: Potential for Falls  Goal: Patient will remain free of falls  INTERVENTIONS:  - Assess patient frequently for physical needs  -  Identify cognitive and physical deficits and behaviors that affect risk of falls    -  Collins fall precautions as indicated by assessment   - Educate patient/family on patient safety including physical limitations  - Instruct patient to call for assistance with activity based on assessment  - Modify environment to reduce risk of injury  - Consider OT/PT consult to assist with strengthening/mobility   Outcome: Progressing      Problem: PAIN - ADULT  Goal: Verbalizes/displays adequate comfort level or baseline comfort level  Interventions:  - Encourage patient to monitor pain and request assistance  - Assess pain using appropriate pain scale  - Administer analgesics based on type and severity of pain and evaluate response  - Implement non-pharmacological measures as appropriate and evaluate response  - Consider cultural and social influences on pain and pain management  - Notify physician/advanced practitioner if interventions unsuccessful or patient reports new pain  Outcome: Progressing      Problem: DISCHARGE PLANNING  Goal: Discharge to home or other facility with appropriate resources  INTERVENTIONS:  - Identify barriers to discharge w/patient and caregiver  - Arrange for needed discharge resources and transportation as appropriate  - Identify discharge learning needs (meds, wound care, etc )  - Arrange for interpretive services to assist at discharge as needed  - Refer to Case Management Department for coordinating discharge planning if the patient needs post-hospital services based on physician/advanced practitioner order or complex needs related to functional status, cognitive ability, or social support system  Outcome: Progressing      Problem: GASTROINTESTINAL - ADULT  Goal: Maintains or returns to baseline bowel function  INTERVENTIONS:  - Assess bowel function  - Encourage oral fluids to ensure adequate hydration  - Administer IV fluids as ordered to ensure adequate hydration  - Administer ordered medications as needed  - Encourage mobilization and activity  - Nutrition services referral to assist patient with appropriate food choices  Outcome: Progressing    Goal: Maintains adequate nutritional intake  INTERVENTIONS:  - Monitor percentage of each meal consumed  - Identify factors contributing to decreased intake, treat as appropriate  - Assist with meals as needed  - Monitor I&O, WT and lab values  - Obtain nutrition services referral as needed  Outcome: Progressing      Problem: HEMATOLOGIC - ADULT  Goal: Maintains hematologic stability  INTERVENTIONS  - Assess for signs and symptoms of bleeding or hemorrhage  - Monitor labs  - Administer supportive blood products/factors as ordered and appropriate  Outcome: Progressing

## 2017-11-11 LAB
BASOPHILS # BLD AUTO: 0.07 THOUSANDS/ΜL (ref 0–0.1)
BASOPHILS NFR BLD AUTO: 1 % (ref 0–1)
EOSINOPHIL # BLD AUTO: 0.82 THOUSAND/ΜL (ref 0–0.61)
EOSINOPHIL NFR BLD AUTO: 12 % (ref 0–6)
ERYTHROCYTE [DISTWIDTH] IN BLOOD BY AUTOMATED COUNT: 14.2 % (ref 11.6–15.1)
GLUCOSE SERPL-MCNC: 142 MG/DL (ref 65–140)
GLUCOSE SERPL-MCNC: 146 MG/DL (ref 65–140)
GLUCOSE SERPL-MCNC: 154 MG/DL (ref 65–140)
GLUCOSE SERPL-MCNC: 213 MG/DL (ref 65–140)
HCT VFR BLD AUTO: 33.3 % (ref 36.5–49.3)
HGB BLD-MCNC: 11.3 G/DL (ref 12–17)
LYMPHOCYTES # BLD AUTO: 2.46 THOUSANDS/ΜL (ref 0.6–4.47)
LYMPHOCYTES NFR BLD AUTO: 36 % (ref 14–44)
MCH RBC QN AUTO: 31.2 PG (ref 26.8–34.3)
MCHC RBC AUTO-ENTMCNC: 33.9 G/DL (ref 31.4–37.4)
MCV RBC AUTO: 92 FL (ref 82–98)
MONOCYTES # BLD AUTO: 0.61 THOUSAND/ΜL (ref 0.17–1.22)
MONOCYTES NFR BLD AUTO: 9 % (ref 4–12)
NEUTROPHILS # BLD AUTO: 2.91 THOUSANDS/ΜL (ref 1.85–7.62)
NEUTS SEG NFR BLD AUTO: 42 % (ref 43–75)
NRBC BLD AUTO-RTO: 0 /100 WBCS
PLATELET # BLD AUTO: 230 THOUSANDS/UL (ref 149–390)
PMV BLD AUTO: 10 FL (ref 8.9–12.7)
RBC # BLD AUTO: 3.62 MILLION/UL (ref 3.88–5.62)
WBC # BLD AUTO: 6.88 THOUSAND/UL (ref 4.31–10.16)

## 2017-11-11 PROCEDURE — C9113 INJ PANTOPRAZOLE SODIUM, VIA: HCPCS | Performed by: HOSPITALIST

## 2017-11-11 PROCEDURE — 82948 REAGENT STRIP/BLOOD GLUCOSE: CPT

## 2017-11-11 PROCEDURE — 85025 COMPLETE CBC W/AUTO DIFF WBC: CPT | Performed by: NURSE PRACTITIONER

## 2017-11-11 RX ADMIN — LISINOPRIL: 20 TABLET ORAL at 08:20

## 2017-11-11 RX ADMIN — INSULIN LISPRO 2 UNITS: 100 INJECTION, SOLUTION INTRAVENOUS; SUBCUTANEOUS at 12:30

## 2017-11-11 RX ADMIN — PANTOPRAZOLE SODIUM 40 MG: 40 INJECTION, POWDER, FOR SOLUTION INTRAVENOUS at 08:23

## 2017-11-11 RX ADMIN — MONTELUKAST SODIUM 10 MG: 10 TABLET, FILM COATED ORAL at 21:25

## 2017-11-11 RX ADMIN — VITAMIN D, TAB 1000IU (100/BT) 1000 UNITS: 25 TAB at 08:20

## 2017-11-11 RX ADMIN — Medication 1000 MG: at 08:20

## 2017-11-11 RX ADMIN — AZELASTINE HYDROCHLORIDE 2 SPRAY: 137 SPRAY, METERED NASAL at 08:19

## 2017-11-11 RX ADMIN — Medication 1 TABLET: at 08:20

## 2017-11-11 RX ADMIN — PANTOPRAZOLE SODIUM 40 MG: 40 INJECTION, POWDER, FOR SOLUTION INTRAVENOUS at 21:21

## 2017-11-11 RX ADMIN — FLUTICASONE PROPIONATE 1 SPRAY: 50 SPRAY, METERED NASAL at 08:18

## 2017-11-11 RX ADMIN — FINASTERIDE 5 MG: 5 TABLET, FILM COATED ORAL at 08:20

## 2017-11-11 NOTE — ASSESSMENT & PLAN NOTE
Pt took a photo of his stool this am still with bright red blood but mixed with soft formed stool Shaka Chapa

## 2017-11-11 NOTE — PROGRESS NOTES
Pt is resting in bed and has no complaints will continue to monitor GI came to see the patient and they are going to do a colonoscopy on Monday

## 2017-11-11 NOTE — PROGRESS NOTES
Progress Note - Venita Lofton 81 y o  male MRN: [id number]    Unit/Bed#: CW2 212-02 Encounter: [id number]        BRBPR (bright red blood per rectum)   Assessment & Plan    · Patient presents with melena at home with several episodes in the Emergency Room  He denies recent weight loss  He states that he had a normal bowel movement yesterday morning at home and it wasn't until 4PM yesterday that he noticed dark colored stool  · Patient states he had approximately 5 dark loose stools overnight of admission   · Admission hemoglobin 14 3, now 11 3 this AM  · Will trend CBC's q12 due to bleeding  · Type and screen active   · Patient denies any history of GI bleed, still continues with some bright red blood in conjunction with soft stool    · Last  Colonoscopy was "several years ago" per patient  · Patient to follow with Dr César Mahmood, Colorectal as an outpatient  · GI consulted, egd performed with no signs of bleed   · Pt will need colonoscopy on Monday discussed today with gi who will order colon prep for procedure   · Continue protonix IV q12  · Continue fiber diet   · ASA/Meloxicam on hold at this time given GI bleed  · Senna and docusate D/C'ed related to frequent bloody BM's  · Hemodynamically stable           Diabetes 1 5, managed as type 2 ([facility] [address] )   Assessment & Plan    · Takes Januvia and metformin at home, on hold  · Now on SSI, BG well controlled   · Patient was refusing insulin from nursing, blood sugars are elevated  · States that he does not want to be hooked on insulin    · I have discussed the fact that we take patient is off oral hypoglycemics and that we are just use insulin acutely  · He has agreement to follow through with having coverage for his elevated sugars at this time will trend now accepting insulin  · Resume home regimen at discharge          Hypertension   Assessment & Plan    · BP well controlled during this visit  · Continue prinivil at this time        Leukocytosis   Assessment & Plan    · WBC 16 35 on admission, down to 6 88  today  · Will continue to trend WBC   · Patient afebrile, will continue to trend            * Melena   Assessment & Plan      Pt took a photo of his stool this am still with bright red blood but mixed with soft formed stool /melena               No new subjective & objective note has been filed under this hospital service since the last note was generated  VTE Pharmacologic Prophylaxis:   Pharmacologic: Pharmacologic VTE Prophylaxis contraindicated due to gi bleed   Mechanical VTE Prophylaxis in Place: Yes    Patient Centered Rounds: I have performed bedside rounds with nursing staff today  Discussions with Specialists or Other Care Team Provider: nursing     Education and Discussions with Family / Patient: patient     Time Spent for Care: 30 minutes  More than 50% of total time spent on counseling and coordination of care as described above  Current Length of Stay: 0 day(s)    Current Patient Status: Inpatient   Certification Statement: The patient will continue to require additional inpatient hospital stay due to colonoscopy on monday     Discharge Plan: post colonoscopy on Monday     Code Status: Level 1 - Full Code      Subjective:   Patient is feeling a little better apprehensive  States that he had a bowel movement with some blood this morning  He took a photo of 8  And showed me  He denies any abdominal pain at this time is currently breakfast nausea or vomiting  Objective:     Vitals:   Temp (24hrs), Av 1 °F (36 7 °C), Min:98 °F (36 7 °C), Max:98 1 °F (36 7 °C)    HR:  [73-78] 74  Resp:  [18] 18  BP: (117-135)/(63-67) 135/63  SpO2:  [94 %-97 %] 97 %  Body mass index is 27 96 kg/m²  Input and Output Summary (last 24 hours):        Intake/Output Summary (Last 24 hours) at 17 6641  Last data filed at 17 1129   Gross per 24 hour   Intake              420 ml   Output                0 ml   Net              420 ml       Physical Exam:     Physical Exam   Constitutional: He is oriented to person, place, and time  He appears well-developed and well-nourished  No distress  HENT:   Head: Normocephalic and atraumatic  Mouth/Throat: No oropharyngeal exudate  Eyes: Conjunctivae are normal  Right eye exhibits no discharge  Left eye exhibits no discharge  No scleral icterus  Neck: Normal range of motion  No tracheal deviation present  No thyromegaly present  Cardiovascular: Normal rate  Exam reveals no gallop and no friction rub  No murmur heard  Pulmonary/Chest: Effort normal  No stridor  No respiratory distress  He has no wheezes  He has no rales  He exhibits no tenderness  Abdominal: Soft  He exhibits no distension and no mass  There is no tenderness  There is no rebound and no guarding  Musculoskeletal: He exhibits no edema, tenderness or deformity  Lymphadenopathy:     He has no cervical adenopathy  Neurological: He is oriented to person, place, and time  Skin: Skin is warm and dry  No rash noted  He is not diaphoretic  No erythema  No pallor  Psychiatric: He has a normal mood and affect  Additional Data:     Labs:      Results from last 7 days  Lab Units 11/11/17  1003   WBC Thousand/uL 6 88   HEMOGLOBIN g/dL 11 3*   HEMATOCRIT % 33 3*   PLATELETS Thousands/uL 230   NEUTROS PCT % 42*   LYMPHS PCT % 36   MONOS PCT % 9   EOS PCT % 12*       Results from last 7 days  Lab Units 11/10/17  0453 11/09/17  1937   SODIUM mmol/L 136 138   POTASSIUM mmol/L 5 0 4 0   CHLORIDE mmol/L 105 104   CO2 mmol/L 25 26   BUN mg/dL 14 15   CREATININE mg/dL 0 75 0 82   CALCIUM mg/dL 7 6* 8 1*   TOTAL PROTEIN g/dL  --  6 4   BILIRUBIN TOTAL mg/dL  --  0 39   ALK PHOS U/L  --  89   ALT U/L  --  20   AST U/L  --  11   GLUCOSE RANDOM mg/dL 150* 200*       Results from last 7 days  Lab Units 11/09/17  1833   INR  1 19*       * I Have Reviewed All Lab Data Listed Above  * Additional Pertinent Lab Tests Reviewed:  All Labs Within Last 24 Hours Reviewed    Imaging:    Imaging Reports Reviewed Today Include: reviewed       Recent Cultures (last 7 days):           Last 24 Hours Medication List:     azelastine 2 spray Each Nare Daily   cholecalciferol 1,000 Units Oral Daily   finasteride 5 mg Oral Daily   fish oil 1,000 mg Oral Daily   fluticasone 1 spray Nasal Daily   insulin lispro 1-5 Units Subcutaneous HS   insulin lispro 1-6 Units Subcutaneous TID AC   lisinopril-hydrochlorothiazide (PRINZIDE 20/12  5) combo dose  Oral Daily   montelukast 10 mg Oral HS   multivitamin-minerals 1 tablet Oral Daily   pantoprazole 40 mg Intravenous Q12H [address]        Today, Patient Was Seen By: INOCENCIO Ross    ** Please Note: Dictation voice to text software may have been used in the creation of this document   **

## 2017-11-11 NOTE — ASSESSMENT & PLAN NOTE
· Patient presents with melena at home with several episodes in the Emergency Room  He denies recent weight loss  He states that he had a normal bowel movement yesterday morning at home and it wasn't until 4PM yesterday that he noticed dark colored stool  · Patient states he had approximately 5 dark loose stools overnight of admission   · Admission hemoglobin 14 3, now 11 3 this AM  · Will trend CBC's q12 due to bleeding  · Type and screen active   · Patient denies any history of GI bleed, still continues with some bright red blood in conjunction with soft stool    · Last  Colonoscopy was "several years ago" per patient  · Patient to follow with Dr Keith Rey, Colorectal as an outpatient  · GI consulted, egd performed with no signs of bleed      · Pt will need colonoscopy on Monday discussed today with gi who will order colon prep for procedure   · Continue protonix IV q12  · Continue fiber diet   · ASA/Meloxicam on hold at this time given GI bleed  · Senna and docusate D/C'ed related to frequent bloody BM's  · Hemodynamically stable

## 2017-11-11 NOTE — PROGRESS NOTES
Feeling well, no dizziness, no lightheadedness, no abdominal pain  Past formed stool was old blood  Blood pressure 134/67, heart rate 78  The patient is awake and comfortable  Abdomen is soft without tenderness  Hemoglobin 11 3 today  Impressions/recommendations:  Most likely lower GI bleeding from colonic diverticuli  Bleeding is decreasing  The patient is stable  To schedule colonoscopy for Monday, November 13

## 2017-11-11 NOTE — ASSESSMENT & PLAN NOTE
· WBC 16 35 on admission, down to 6 88  today     · Will continue to trend WBC   · Patient afebrile, will continue to trend

## 2017-11-11 NOTE — ASSESSMENT & PLAN NOTE
· Takes Januvia and metformin at home, on hold  · Now on SSI, BG well controlled   · Patient was refusing insulin from nursing, blood sugars are elevated  · States that he does not want to be hooked on insulin    · I have discussed the fact that we take patient is off oral hypoglycemics and that we are just use insulin acutely  · He has agreement to follow through with having coverage for his elevated sugars at this time will trend now accepting insulin  · Resume home regimen at discharge

## 2017-11-12 LAB
BASOPHILS # BLD AUTO: 0.13 THOUSANDS/ΜL (ref 0–0.1)
BASOPHILS NFR BLD AUTO: 2 % (ref 0–1)
EOSINOPHIL # BLD AUTO: 0.99 THOUSAND/ΜL (ref 0–0.61)
EOSINOPHIL NFR BLD AUTO: 14 % (ref 0–6)
ERYTHROCYTE [DISTWIDTH] IN BLOOD BY AUTOMATED COUNT: 14.1 % (ref 11.6–15.1)
GLUCOSE SERPL-MCNC: 118 MG/DL (ref 65–140)
GLUCOSE SERPL-MCNC: 164 MG/DL (ref 65–140)
GLUCOSE SERPL-MCNC: 172 MG/DL (ref 65–140)
GLUCOSE SERPL-MCNC: 183 MG/DL (ref 65–140)
HCT VFR BLD AUTO: 33.8 % (ref 36.5–49.3)
HGB BLD-MCNC: 11.2 G/DL (ref 12–17)
LYMPHOCYTES # BLD AUTO: 2.49 THOUSANDS/ΜL (ref 0.6–4.47)
LYMPHOCYTES NFR BLD AUTO: 34 % (ref 14–44)
MCH RBC QN AUTO: 30.4 PG (ref 26.8–34.3)
MCHC RBC AUTO-ENTMCNC: 33.1 G/DL (ref 31.4–37.4)
MCV RBC AUTO: 92 FL (ref 82–98)
MONOCYTES # BLD AUTO: 0.76 THOUSAND/ΜL (ref 0.17–1.22)
MONOCYTES NFR BLD AUTO: 10 % (ref 4–12)
NEUTROPHILS # BLD AUTO: 2.94 THOUSANDS/ΜL (ref 1.85–7.62)
NEUTS SEG NFR BLD AUTO: 40 % (ref 43–75)
NRBC BLD AUTO-RTO: 0 /100 WBCS
PLATELET # BLD AUTO: 242 THOUSANDS/UL (ref 149–390)
PMV BLD AUTO: 10.7 FL (ref 8.9–12.7)
RBC # BLD AUTO: 3.69 MILLION/UL (ref 3.88–5.62)
WBC # BLD AUTO: 7.33 THOUSAND/UL (ref 4.31–10.16)

## 2017-11-12 PROCEDURE — 82948 REAGENT STRIP/BLOOD GLUCOSE: CPT

## 2017-11-12 PROCEDURE — C9113 INJ PANTOPRAZOLE SODIUM, VIA: HCPCS | Performed by: HOSPITALIST

## 2017-11-12 PROCEDURE — 85025 COMPLETE CBC W/AUTO DIFF WBC: CPT | Performed by: PHYSICIAN ASSISTANT

## 2017-11-12 RX ADMIN — FLUTICASONE PROPIONATE 1 SPRAY: 50 SPRAY, METERED NASAL at 09:37

## 2017-11-12 RX ADMIN — FINASTERIDE 5 MG: 5 TABLET, FILM COATED ORAL at 09:35

## 2017-11-12 RX ADMIN — Medication 1000 MG: at 09:36

## 2017-11-12 RX ADMIN — BISACODYL 10 MG: 5 TABLET, COATED ORAL at 15:10

## 2017-11-12 RX ADMIN — AZELASTINE HYDROCHLORIDE 2 SPRAY: 137 SPRAY, METERED NASAL at 09:36

## 2017-11-12 RX ADMIN — LISINOPRIL: 20 TABLET ORAL at 09:35

## 2017-11-12 RX ADMIN — INSULIN LISPRO 1 UNITS: 100 INJECTION, SOLUTION INTRAVENOUS; SUBCUTANEOUS at 09:29

## 2017-11-12 RX ADMIN — Medication 1 TABLET: at 09:36

## 2017-11-12 RX ADMIN — POLYETHYLENE GLYCOL 3350, SODIUM SULFATE ANHYDROUS, SODIUM BICARBONATE, SODIUM CHLORIDE, POTASSIUM CHLORIDE 4000 ML: 236; 22.74; 6.74; 5.86; 2.97 POWDER, FOR SOLUTION ORAL at 15:04

## 2017-11-12 RX ADMIN — PANTOPRAZOLE SODIUM 40 MG: 40 INJECTION, POWDER, FOR SOLUTION INTRAVENOUS at 09:35

## 2017-11-12 RX ADMIN — VITAMIN D, TAB 1000IU (100/BT) 1000 UNITS: 25 TAB at 09:36

## 2017-11-12 RX ADMIN — INSULIN LISPRO 1 UNITS: 100 INJECTION, SOLUTION INTRAVENOUS; SUBCUTANEOUS at 16:41

## 2017-11-12 RX ADMIN — PANTOPRAZOLE SODIUM 40 MG: 40 INJECTION, POWDER, FOR SOLUTION INTRAVENOUS at 21:46

## 2017-11-12 RX ADMIN — INSULIN LISPRO 1 UNITS: 100 INJECTION, SOLUTION INTRAVENOUS; SUBCUTANEOUS at 11:46

## 2017-11-12 RX ADMIN — MONTELUKAST SODIUM 10 MG: 10 TABLET, FILM COATED ORAL at 21:46

## 2017-11-12 NOTE — ASSESSMENT & PLAN NOTE
· Takes Januvia, amaryl and metformin at home, on hold  · Creatinine 0 75  · HA1C most recently 6 6 on 8/11/17  · Now on SSI, BG moderately controlled   · Patient was refusing insulin from nursing, blood sugars are elevated  · States that he does not want to be hooked on insulin    · I have discussed the fact that we take patient is off oral hypoglycemics and that we are just use insulin acutely  · He has agreement to follow through with having coverage for his elevated sugars at this time will trend now accepting insulin  · Continue diabetic diet/high fiber diet, until midnight then npo for scope in am monitor bs   · Resume home regimen at discharge

## 2017-11-12 NOTE — ASSESSMENT & PLAN NOTE
· WBC 16 35 on admission, down to 7 33  today     · Will continue to trend WBC   · Patient afebrile, will continue to trend

## 2017-11-12 NOTE — PROGRESS NOTES
Progress Note - Sweetie Pena 81 y o  male MRN: [id number]    Unit/Bed#: [facility] 605-01 Encounter: [id number]        BRBPR (bright red blood per rectum)   Assessment & Plan    · Patient presents with melena at home with several episodes in the Emergency Room  He denies recent weight loss  He states that he had a normal bowel movement the morning of admission and it wasn't until 4PM that he noticed dark colored stool  · Patient denies any history of GI bleed  · Last  Colonoscopy was "several years ago" per patient  · Patient states he had approximately 5 dark loose stools overnight of admission on 11/10  · Admission hemoglobin 14 3, now 11 2 this AM  · Still continues with one loose dark stool this morning  He states that his diarrhea has improved and he only went once overnight  · Will order CBC for the morning  · Type and screen no longer active but hemoglobin has stabilized at this point  · Patient to follow with Dr Rochelle Arauz, Colorectal as an outpatient  · GI consulted and performed EGD on 11/10 which showed no ulcers but areas of erosions that are unlikely source of bleeding  Plan for colonoscopy 11/13  Bowel prep and NPO at midnight orders in place  · Continue protonix IV q12  · Continue fiber diet   · ASA/Meloxicam on hold at this time given GI bleed  · Hemodynamically stable           Diabetes 1 5, managed as type 2 ([facility])   Assessment & Plan    · Takes Januvia, amaryl and metformin at home, on hold  · Creatinine 0 75  · HA1C most recently 6 6 on 8/11/17  · Now on SSI, BG moderately controlled   · Patient was refusing insulin from nursing, blood sugars are elevated  · States that he does not want to be hooked on insulin    · I have discussed the fact that we take patient is off oral hypoglycemics and that we are just use insulin acutely  · He has agreement to follow through with having coverage for his elevated sugars at this time will trend now accepting insulin  · Continue diabetic diet/high fiber diet, until midnight then npo for scope in am monitor bs   · Resume home regimen at discharge          Leukocytosis   Assessment & Plan    · WBC 16 35 on admission, down to 7 33  today  · Will continue to trend WBC   · Patient afebrile, will continue to trend            * Melena   Assessment & Plan    · Pt took a photo of his stool  still with bright red blood but mixed with soft formed stool /melena   · States that stools are becoming less frequent but that they are still dark red in color            VTE Pharmacologic Prophylaxis:   Pharmacologic: contraindicated due to GI bleed/ pt also ambulatory   Mechanical VTE Prophylaxis in Place: Yes    Patient Centered Rounds: I have performed bedside rounds with nursing staff today  Discussions with Specialists or Other Care Team Provider: nursing    Education and Discussions with Family / Patient: patient    Time Spent for Care: 30 minutes  More than 50% of total time spent on counseling and coordination of care as described above  Current Length of Stay: 1 day(s)    Current Patient Status: Inpatient   Certification Statement: The patient will continue to require additional inpatient hospital stay due to trend H&H, scheduled colonoscopy tomorrow    Discharge Plan: discharge home when medically stable    Code Status: Level 1 - Full Code      Subjective:   Patient states that his bowel movements are becoming less frequent but that they are still dark and bloody  He had one BM this morning  He denies nausea, abdominal tenderness or gas pains  Denies fever, chills, or dizziness  He is frustrated that he has to wait until tomorrow for colonoscopy to find out what is going on  Objective:     Vitals:   Temp (24hrs), Av 2 °F (36 8 °C), Min:97 9 °F (36 6 °C), Max:98 5 °F (36 9 °C)    HR:  [64-85] 85  Resp:  [18-20] 18  BP: (100-120)/(50-72) 110/72  SpO2:  [96 %-99 %] 96 %  Body mass index is 27 96 kg/m²       Input and Output Summary (last 24 hours): Intake/Output Summary (Last 24 hours) at 11/12/17 1815  Last data filed at 11/12/17 1300   Gross per 24 hour   Intake                0 ml   Output                0 ml   Net                0 ml       Physical Exam:     Physical Exam   Constitutional: He is oriented to person, place, and time  He appears well-developed and well-nourished  No distress  HENT:   Head: Normocephalic and atraumatic  Right Ear: External ear normal    Left Ear: External ear normal    Eyes: Conjunctivae are normal  Pupils are equal, round, and reactive to light  No scleral icterus  Neck: Normal range of motion  Neck supple  No tracheal deviation present  No thyromegaly present  Cardiovascular: Normal rate, regular rhythm and normal heart sounds  Exam reveals no gallop and no friction rub  No murmur heard  Pulmonary/Chest: Effort normal and breath sounds normal  No respiratory distress  He has no wheezes  He has no rales  Abdominal: Soft  Bowel sounds are normal  He exhibits no distension  There is no tenderness  There is no rebound and no guarding  Musculoskeletal: Normal range of motion  He exhibits no edema, tenderness or deformity  Neurological: He is alert and oriented to person, place, and time  No cranial nerve deficit  Skin: Skin is warm and dry  No rash noted  He is not diaphoretic  No erythema  Psychiatric: He has a normal mood and affect   His behavior is normal  Thought content normal        Additional Data:     Labs:      Results from last 7 days  Lab Units 11/12/17  0621   WBC Thousand/uL 7 33   HEMOGLOBIN g/dL 11 2*   HEMATOCRIT % 33 8*   PLATELETS Thousands/uL 242   NEUTROS PCT % 40*   LYMPHS PCT % 34   MONOS PCT % 10   EOS PCT % 14*       Results from last 7 days  Lab Units 11/10/17  0453 11/09/17  1937   SODIUM mmol/L 136 138   POTASSIUM mmol/L 5 0 4 0   CHLORIDE mmol/L 105 104   CO2 mmol/L 25 26   BUN mg/dL 14 15   CREATININE mg/dL 0 75 0 82   CALCIUM mg/dL 7 6* 8 1*   TOTAL PROTEIN g/dL  --  6 4 BILIRUBIN TOTAL mg/dL  --  0 39   ALK PHOS U/L  --  89   ALT U/L  --  20   AST U/L  --  11   GLUCOSE RANDOM mg/dL 150* 200*       Results from last 7 days  Lab Units 11/09/17  1833   INR  1 19*       * I Have Reviewed All Lab Data Listed Above  * Additional Pertinent Lab Tests Reviewed: All Labs Within Last 24 Hours Reviewed    Imaging:    Imaging Reports Reviewed Today Include: Reviewed      Recent Cultures (last 7 days):           Last 24 Hours Medication List:     azelastine 2 spray Each Nare Daily   cholecalciferol 1,000 Units Oral Daily   finasteride 5 mg Oral Daily   fish oil 1,000 mg Oral Daily   fluticasone 1 spray Nasal Daily   insulin lispro 1-5 Units Subcutaneous HS   insulin lispro 1-6 Units Subcutaneous TID AC   lisinopril-hydrochlorothiazide (PRINZIDE 20/12  5) combo dose  Oral Daily   montelukast 10 mg Oral HS   multivitamin-minerals 1 tablet Oral Daily   pantoprazole 40 mg Intravenous Q12H [address]        Today, Patient Was Seen By: Johann Councilman, CRNP    ** Please Note: Dictation voice to text software may have been used in the creation of this document   **

## 2017-11-12 NOTE — ASSESSMENT & PLAN NOTE
· Patient presents with melena at home with several episodes in the Emergency Room  He denies recent weight loss  He states that he had a normal bowel movement the morning of admission and it wasn't until 4PM that he noticed dark colored stool  · Patient denies any history of GI bleed  · Last  Colonoscopy was "several years ago" per patient  · Patient states he had approximately 5 dark loose stools overnight of admission on 11/10  · Admission hemoglobin 14 3, now 11 2 this AM  · Still continues with one loose dark stool this morning  He states that his diarrhea has improved and he only went once overnight  · Will order CBC for the morning  · Type and screen no longer active but hemoglobin has stabilized at this point  · Patient to follow with Dr Houston Ruano, Colorectal as an outpatient  · GI consulted and performed EGD on 11/10 which showed no ulcers but areas of erosions that are unlikely source of bleeding  Plan for colonoscopy 11/13   Bowel prep and NPO at midnight orders in place  · Continue protonix IV q12  · Continue fiber diet   · ASA/Meloxicam on hold at this time given GI bleed  · Hemodynamically stable

## 2017-11-12 NOTE — ASSESSMENT & PLAN NOTE
· Pt took a photo of his stool 11/11 still with bright red blood but mixed with soft formed stool /melena   · States that stools are becoming less frequent but that they are still dark red in color

## 2017-11-12 NOTE — PROGRESS NOTES
Encouraged a pumps 18 hours a day needs reinforcement , explained contradictions, risk for blood clot,, independent ambulating

## 2017-11-13 ENCOUNTER — ANESTHESIA (INPATIENT)
Dept: GASTROENTEROLOGY | Facility: HOSPITAL | Age: 72
DRG: 379 | End: 2017-11-13
Payer: COMMERCIAL

## 2017-11-13 ENCOUNTER — ANESTHESIA EVENT (INPATIENT)
Dept: GASTROENTEROLOGY | Facility: HOSPITAL | Age: 72
DRG: 379 | End: 2017-11-13
Payer: COMMERCIAL

## 2017-11-13 ENCOUNTER — GENERIC CONVERSION - ENCOUNTER (OUTPATIENT)
Dept: OTHER | Facility: OTHER | Age: 72
End: 2017-11-13

## 2017-11-13 VITALS
HEIGHT: 65 IN | SYSTOLIC BLOOD PRESSURE: 114 MMHG | TEMPERATURE: 97.9 F | HEART RATE: 76 BPM | WEIGHT: 168 LBS | RESPIRATION RATE: 20 BRPM | BODY MASS INDEX: 27.99 KG/M2 | OXYGEN SATURATION: 98 % | DIASTOLIC BLOOD PRESSURE: 61 MMHG

## 2017-11-13 PROBLEM — K62.5 BRBPR (BRIGHT RED BLOOD PER RECTUM): Status: RESOLVED | Noted: 2017-11-09 | Resolved: 2017-11-13

## 2017-11-13 PROBLEM — D72.829 LEUKOCYTOSIS: Status: RESOLVED | Noted: 2017-11-09 | Resolved: 2017-11-13

## 2017-11-13 PROBLEM — K92.1 MELENA: Status: RESOLVED | Noted: 2017-11-09 | Resolved: 2017-11-13

## 2017-11-13 LAB
ANION GAP SERPL CALCULATED.3IONS-SCNC: 7 MMOL/L (ref 4–13)
BASOPHILS # BLD AUTO: 0.13 THOUSANDS/ΜL (ref 0–0.1)
BASOPHILS NFR BLD AUTO: 2 % (ref 0–1)
BUN SERPL-MCNC: 13 MG/DL (ref 5–25)
CALCIUM SERPL-MCNC: 8.4 MG/DL (ref 8.3–10.1)
CHLORIDE SERPL-SCNC: 101 MMOL/L (ref 100–108)
CO2 SERPL-SCNC: 31 MMOL/L (ref 21–32)
CREAT SERPL-MCNC: 1.01 MG/DL (ref 0.6–1.3)
EOSINOPHIL # BLD AUTO: 0.66 THOUSAND/ΜL (ref 0–0.61)
EOSINOPHIL NFR BLD AUTO: 9 % (ref 0–6)
ERYTHROCYTE [DISTWIDTH] IN BLOOD BY AUTOMATED COUNT: 14.1 % (ref 11.6–15.1)
GFR SERPL CREATININE-BSD FRML MDRD: 75 ML/MIN/1.73SQ M
GLUCOSE SERPL-MCNC: 137 MG/DL (ref 65–140)
GLUCOSE SERPL-MCNC: 173 MG/DL (ref 65–140)
GLUCOSE SERPL-MCNC: 173 MG/DL (ref 65–140)
HCT VFR BLD AUTO: 34.1 % (ref 36.5–49.3)
HGB BLD-MCNC: 11.6 G/DL (ref 12–17)
INR PPP: 1.17 (ref 0.86–1.16)
LYMPHOCYTES # BLD AUTO: 2.37 THOUSANDS/ΜL (ref 0.6–4.47)
LYMPHOCYTES NFR BLD AUTO: 32 % (ref 14–44)
MCH RBC QN AUTO: 31.2 PG (ref 26.8–34.3)
MCHC RBC AUTO-ENTMCNC: 34 G/DL (ref 31.4–37.4)
MCV RBC AUTO: 92 FL (ref 82–98)
MONOCYTES # BLD AUTO: 0.72 THOUSAND/ΜL (ref 0.17–1.22)
MONOCYTES NFR BLD AUTO: 10 % (ref 4–12)
NEUTROPHILS # BLD AUTO: 3.54 THOUSANDS/ΜL (ref 1.85–7.62)
NEUTS SEG NFR BLD AUTO: 47 % (ref 43–75)
NRBC BLD AUTO-RTO: 0 /100 WBCS
PLATELET # BLD AUTO: 284 THOUSANDS/UL (ref 149–390)
PMV BLD AUTO: 10.6 FL (ref 8.9–12.7)
POTASSIUM SERPL-SCNC: 4 MMOL/L (ref 3.5–5.3)
PROTHROMBIN TIME: 15 SECONDS (ref 12.1–14.4)
RBC # BLD AUTO: 3.72 MILLION/UL (ref 3.88–5.62)
SODIUM SERPL-SCNC: 139 MMOL/L (ref 136–145)
WBC # BLD AUTO: 7.44 THOUSAND/UL (ref 4.31–10.16)

## 2017-11-13 PROCEDURE — 82948 REAGENT STRIP/BLOOD GLUCOSE: CPT

## 2017-11-13 PROCEDURE — 88305 TISSUE EXAM BY PATHOLOGIST: CPT | Performed by: INTERNAL MEDICINE

## 2017-11-13 PROCEDURE — 85610 PROTHROMBIN TIME: CPT | Performed by: NURSE PRACTITIONER

## 2017-11-13 PROCEDURE — 0DJD8ZZ INSPECTION OF LOWER INTESTINAL TRACT, VIA NATURAL OR ARTIFICIAL OPENING ENDOSCOPIC: ICD-10-PCS | Performed by: INTERNAL MEDICINE

## 2017-11-13 PROCEDURE — 85025 COMPLETE CBC W/AUTO DIFF WBC: CPT | Performed by: NURSE PRACTITIONER

## 2017-11-13 PROCEDURE — 80048 BASIC METABOLIC PNL TOTAL CA: CPT | Performed by: NURSE PRACTITIONER

## 2017-11-13 PROCEDURE — C9113 INJ PANTOPRAZOLE SODIUM, VIA: HCPCS | Performed by: HOSPITALIST

## 2017-11-13 RX ORDER — PROPOFOL 10 MG/ML
INJECTION, EMULSION INTRAVENOUS CONTINUOUS PRN
Status: DISCONTINUED | OUTPATIENT
Start: 2017-11-13 | End: 2017-11-13 | Stop reason: SURG

## 2017-11-13 RX ORDER — MAGNESIUM CARB/ALUMINUM HYDROX 105-160MG
296 TABLET,CHEWABLE ORAL ONCE
Status: COMPLETED | OUTPATIENT
Start: 2017-11-13 | End: 2017-11-13

## 2017-11-13 RX ORDER — PROPOFOL 10 MG/ML
INJECTION, EMULSION INTRAVENOUS AS NEEDED
Status: DISCONTINUED | OUTPATIENT
Start: 2017-11-13 | End: 2017-11-13 | Stop reason: SURG

## 2017-11-13 RX ORDER — BISACODYL 10 MG
10 SUPPOSITORY, RECTAL RECTAL ONCE
Status: DISCONTINUED | OUTPATIENT
Start: 2017-11-14 | End: 2017-11-13

## 2017-11-13 RX ORDER — SODIUM CHLORIDE 9 MG/ML
INJECTION, SOLUTION INTRAVENOUS CONTINUOUS PRN
Status: DISCONTINUED | OUTPATIENT
Start: 2017-11-13 | End: 2017-11-13 | Stop reason: SURG

## 2017-11-13 RX ADMIN — FINASTERIDE 5 MG: 5 TABLET, FILM COATED ORAL at 08:19

## 2017-11-13 RX ADMIN — SODIUM CHLORIDE: 0.9 INJECTION, SOLUTION INTRAVENOUS at 12:20

## 2017-11-13 RX ADMIN — PROPOFOL 150 MCG/KG/MIN: 10 INJECTION, EMULSION INTRAVENOUS at 12:21

## 2017-11-13 RX ADMIN — PROPOFOL 50 MG: 10 INJECTION, EMULSION INTRAVENOUS at 12:25

## 2017-11-13 RX ADMIN — Medication 1 TABLET: at 08:18

## 2017-11-13 RX ADMIN — MAGESIUM CITRATE 296 ML: 1.75 LIQUID ORAL at 06:06

## 2017-11-13 RX ADMIN — LISINOPRIL: 20 TABLET ORAL at 08:18

## 2017-11-13 RX ADMIN — INSULIN LISPRO 1 UNITS: 100 INJECTION, SOLUTION INTRAVENOUS; SUBCUTANEOUS at 08:25

## 2017-11-13 RX ADMIN — PANTOPRAZOLE SODIUM 40 MG: 40 INJECTION, POWDER, FOR SOLUTION INTRAVENOUS at 08:20

## 2017-11-13 RX ADMIN — Medication 1000 MG: at 08:19

## 2017-11-13 RX ADMIN — FLUTICASONE PROPIONATE 1 SPRAY: 50 SPRAY, METERED NASAL at 08:20

## 2017-11-13 RX ADMIN — VITAMIN D, TAB 1000IU (100/BT) 1000 UNITS: 25 TAB at 08:19

## 2017-11-13 RX ADMIN — PROPOFOL 100 MG: 10 INJECTION, EMULSION INTRAVENOUS at 12:20

## 2017-11-13 RX ADMIN — AZELASTINE HYDROCHLORIDE 2 SPRAY: 137 SPRAY, METERED NASAL at 08:24

## 2017-11-13 NOTE — ASSESSMENT & PLAN NOTE
· Leukocytosis resolved  · WBC 16 35 on admission, down to 7 44  today     · Will continue to trend WBC   · Patient afebrile, will continue to trend

## 2017-11-13 NOTE — DISCHARGE SUMMARY
Discharge Summary - [address] [facility]    Patient Information: Ravi Scott 81 y o  male MRN: [id number]  Unit/Bed#: [facility] Encounter: [id number]    Discharging Physician / Practitioner: INOCENCIO Rice  PCP: Rolo Arzate DO  Admission Date: 11/9/2017  Discharge Date: 11/13/17    Reason for Admission: New onset melena    Discharge Diagnoses:     Principal Problem:    Melena  Active Problems:    BRBPR (bright red blood per rectum)    Leukocytosis    Hypertension    Diabetes 1 5, managed as type 2 ([address] )  Resolved Problems:    * No resolved hospital problems  *      Consultations During Hospital Stay:  · Gastroenterology  · Colorectal    Procedures Performed:     EGD 11/10: Did not reveal ulcers but some erosions  This is unlikely to be the cause of his overt GI bleeding  The Z line was visualized at 40 cm from the entry   site and appeared slightly irregular  Variation less than 1 cm  Stomach: There was erosion in the fundus  A biopsy was taken from the body of the stomach  Duodenum: There was evidence of duodenitis in the duodenal bulb  Colonoscopy: 11/13: There was evidence of moderately severe diverticulosis in the ascending colon, sigmoid colon, and descending colon  Medium-sized internal hemorrhoids were found in the rectum  The hemorrhoids showed no bleeding stigmata  · Hemoglobin on admission 11/09: 14 3--> 11 6 on 11/13 but stable over the last several days, did not require a transfusion      Significant Findings / Test Results:     · See above    Incidental Findings:   · none    Test Results Pending at Discharge (will require follow up):   · none     Outpatient Tests Requested:  · none    Complications:  none    Hospital Course:     Ravi Scott is a 81 y o  male patient who originally presented to the hospital on 11/9/2017 due to new onset melena   Mr Geeta Hill has a past medical history of diabetes II, hypertension, arthritis where he took daily ASA and meloxicam  He presented after having 2 episodes of dark tarry stools at home  In the emergency room, he had several episodes of bright red stool which continued throughout the night  Admitting hemoglobin was 14 3 but dropped 2 grams in 12 hours  GI was consulted  He had an EGD performed on 11/10 that showed no ulcers but some areas of erosions in the fundus with some duodenitis  Patient was started on IV Protonix BID and clear liquid diet  His hemoglobin stabilized over the course of the next couple days  He was hemodynamically stable throughout the admission  On 11/13 he had a colonoscopy that revealed evidence of moderatly severe diverticulosis in the ascending colon, sigmoid colon, and descending colon  There were also medium-sized internal hemorrhoids found in the rectum  The hemorrhoids showed no bleeding stigmata  He will need follow up with his PCP in 1 week  He may also follow up with Colorectal Dr Keith Rey or gastroenterology as they did the EGD and the colonoscopy  He will resume home diabetic and hypertension medications  Patient was educated on high fiber/diabetic diet and avoiding NSAID medication use  Condition at Discharge: good     Discharge Day Visit / Exam:     * Please refer to separate progress note for these details *    Discussion with Family: patient      Discharge instructions/Information to patient and family:   See after visit summary for information provided to patient and family  Provisions for Follow-Up Care:  See after visit summary for information related to follow-up care and any pertinent home health orders  Disposition:     Home    For Discharges to [facility] SNF:   · Not Applicable to this Patient - Not Applicable to this Patient    Planned Readmission: no planned readmission    Discharge Statement:  I spent 45 minutes discharging the patient  This time was spent on the day of discharge  I had direct contact with the patient on the day of discharge   Greater than 50% of the total time was spent examining patient, answering all patient questions, arranging and discussing plan of care with patient as well as directly providing post-discharge instructions  Additional time then spent on discharge activities  Discharge Medications:  See after visit summary for reconciled discharge medications provided to patient and family  ** Please Note: This note has been constructed using a voice recognition system   **

## 2017-11-13 NOTE — OP NOTE
**** GI/ENDOSCOPY REPORT ****     PATIENT NAME: RENU CUI ------ VISIT ID:  Patient ID:   [id number] YOB: 1945     INTRODUCTION: Colonoscopy - A 81 male patient presents for an inpatient   Colonoscopy at [address] [place] [address]  PREVIOUS COLONOSCOPY: Patient's last colonoscopy was 4 years ago  INDICATIONS: Rectal bleeding  CONSENT:  The benefits, risks, and alternatives to the procedure were   discussed and informed consent was obtained from the patient  PREPARATION: EKG, pulse, pulse oximetry and blood pressure were monitored   throughout the procedure  The patient was identified by myself both   verbally and by visual inspection of ID band  Airway Assessment   Classification: Airway class 2 - Visualization of the soft palate, fauces   and uvula  ASA Classification: Class 2 - Patient has mild to moderate   systemic disturbance that may or may not be related to the disorder   requiring surgery  MEDICATIONS: Anesthesia-check records     PROCEDURE:  The endoscope was passed without difficulty through the anus   under direct visualization and advanced to the cecum, confirmed by   appendiceal orifice and ileocecal valve  The scope was withdrawn and the   mucosa was carefully examined  The quality of the preparation was good  RECTAL EXAM: Normal rectal exam      FINDINGS:  There was evidence of moderately severe diverticulosis in the   ascending colon, sigmoid colon, and descending colon  Medium-sized   internal hemorrhoids were found in the rectum  The hemorrhoids showed no   bleeding stigmata  COMPLICATIONS: There were no complications  IMPRESSIONS: Moderately severe diverticulosis found in the ascending   colon, sigmoid colon, and descending colon  No blood seen in colon  Internal hemorrhoids in the rectum  RECOMMENDATIONS: Discharge home when standard parameters are met  Start   high fiber diet  Avoid constipation       ESTIMATED BLOOD LOSS:     PATHOLOGY SPECIMENS:     PROCEDURE CODES:     ICD-9 Codes: 569 3 Hemorrhage of rectum and anus 562 10 Diverticulosis of   colon (without mention of hemorrhage)     ICD-10 Codes: K62 5 Hemorrhage of anus and rectum K57 Diverticular disease   of intestine K64 9 Unspecified hemorrhoids     PERFORMED BY: USAMA Tenorio  on 11/13/2017  Version 1, electronically signed by USAMA Breaux  on 11/13/2017   at 13:00

## 2017-11-13 NOTE — ASSESSMENT & PLAN NOTE
· Patient presents with melena at home with several episodes in the Emergency Room  He denies recent weight loss  He states that he had a normal bowel movement the morning of admission and it wasn't until 4PM that he noticed dark colored stool  · Patient denies any history of GI bleed  · Last  Colonoscopy was "several years ago" per patient  · Patient states he had approximately 5 dark loose stools overnight of admission on 11/10  · Admission hemoglobin 14 3, now 11 6 this AM  · Still continues with one loose dark stool this morning  He states that his diarrhea has improved and he only went once overnight  · Will order CBC for the morning  · Type and screen no longer active but hemoglobin has stabilized at this point  · Patient to follow with Dr Mary Ann Bustamante, Colorectal as an outpatient  · GI consulted and performed EGD on 11/10 which showed no ulcers but areas of erosions that are unlikely source of bleeding  Plan for colonoscopy today 11/13  Patient had bowel prep starting last night but states that he is not yet clear  RN states that patient also had mag citrate this morning at 0600 and will continue to give Golytely     · Continue protonix IV q12  · Continue fiber diet   · ASA/Meloxicam on hold at this time given GI bleed  · Hemodynamically stable

## 2017-11-13 NOTE — ASSESSMENT & PLAN NOTE
· Takes Januvia, amaryl and metformin at home, on hold  · Creatinine 1 01 today  · HA1C most recently 6 6 on 8/11/17  · Now on SSI, BG moderately controlled   · Patient was refusing insulin from nursing, blood sugars are elevated  · States that he does not want to be hooked on insulin    · I have discussed the fact that we take patient is off oral hypoglycemics and that we are just use insulin acutely  · He has agreement to follow through with having coverage for his elevated sugars at this time will trend now accepting insulin  · NPO for procedure, will resume diabetic/high fiber diet post colonoscopy  · Resume home regimen at discharge

## 2017-11-13 NOTE — ANESTHESIA POSTPROCEDURE EVALUATION
Post-Op Assessment Note      CV Status:  Stable    Mental Status:  Lethargic    Hydration Status:  Euvolemic    PONV Controlled:  Controlled    Airway Patency:  Patent and adequate    Post Op Vitals Reviewed: Yes          Staff: CRNA       Comments: Airway reflexes intact          BP   90/51   Temp     Pulse  70   Resp   13   SpO2   95% on RA

## 2017-11-13 NOTE — ANESTHESIA PREPROCEDURE EVALUATION
Review of Systems/Medical History  Patient summary reviewed    No history of anesthetic complications     Cardiovascular  Exercise tolerance: good,  Hyperlipidemia, Hypertension ,    Pulmonary  Negative pulmonary ROS ,        GI/Hepatic    GI bleeding (melena) , Bowel prep       Negative  ROS        Endo/Other  Diabetes well controlled , Arthritis     GYN       Hematology  Negative hematology ROS      Musculoskeletal  Negative musculoskeletal ROS        Neurology  Negative neurology ROS      Psychology   Negative psychology ROS            Physical Exam    Airway    Mallampati score: II  TM Distance: >3 FB  Neck ROM: full     Dental   No notable dental hx     Cardiovascular      Pulmonary      Other Findings       Lab Results   Component Value Date    WBC 7 44 11/13/2017    HGB 11 6 (L) 11/13/2017     11/13/2017     Lab Results   Component Value Date     11/13/2017    K 4 0 11/13/2017    BUN 13 11/13/2017    CREATININE 1 01 11/13/2017    GLUCOSE 173 (H) 11/13/2017     Lab Results   Component Value Date    PTT 30 11/09/2017      Lab Results   Component Value Date    INR 1 17 (H) 11/13/2017     Lab Results   Component Value Date    HGBA1C 6 6 (H) 08/11/2017         Anesthesia Plan  ASA Score- 2       Anesthesia Type- IV sedation with anesthesia with ASA Monitors  Additional Monitors:   Airway Plan:           Induction- intravenous  Informed Consent- Anesthetic plan and risks discussed with patient  I personally reviewed this patient with the CRNA  Discussed and agreed on the Anesthesia Plan with the CRNA  Shilpi Tovar

## 2017-11-13 NOTE — DISCHARGE INSTRUCTIONS
Avoid NSAIDs such as Advil and Aleve as they can cause further stomach irritation  Will discontinue Mobic  Hemorragia gastrointestinal   LO QUE NECESITA SABER:   La hemorragia (sangrado) gastrointestinal (GI) puede ocurrir en cualquier parte de hernandez tracto digestivo  Dianna incluye hernandez esófago, estómago, intestinos, recto o ano  El sangrado puede ser de leve a grave  El sangrado puede comenzar repentinamente o comenzar lentamente y durar un período más aspen de [place]  El sangrado que dura un periodo de tiempo más aspen se denomina sangrado GI crónico    INSTRUCCIONES SOBRE [facility]ITALARIA:   [place] al 911 en nazanin de presentar lo siguiente:   · Usted tiene dificultad para respirar o le falta el aire  · Usted se desmaya o pierde el conocimiento  · Usted tiene dolor en el pecho  Regrese a la carmita de emergencias si:   · Usted está demasiado débil o mareado para ponerse de pie  · Hernandez corazón está latiendo más rápido de lo normal      · Usted vomita pushpa o el vómito tiene la apariencia de café molido  · Usted tiene [employer]  · Usted tiene dolor o inflamación abdominal   Pregúntele a hernandez médico qué vitaminas y minerales son adecuados para usted  · Usted tiene evacuaciones intestinales alquitranadas o negras  · Usted tiene náuseas o está vomitando  · Usted tiene [employer]      · Usted tiene preguntas o inquietudes acerca de hernandez condición o cuidado  Actividad:  Repose según le indicaron  Pregunte cuando puede volver a [place] actividades regulares gabbie por [place], hernandez [place]  Lentamente aumente holland actividades diarias  Nutrición:  Pregunte si necesita seguir tootie dieta especial  [place] especial puede ayudar a tratar afecciones GI y prevenir problemas gabbie el sangrado gastrointestinal  Cosmos comidas pequeñas más a menudo mientras que kathleen hernandez sistema digestivo  Evitar o limitar la cafeína y comidas picantes  También evite los alimentos que causan [place], náuseas o [place]  Prevenir el sangrado gastrointestinal:   · Manejar afecciones gastrointestinales gabbie se indica  Ejemplos de afecciones gastrointestinales incluyen reflujo gastroesofágico, úlcera péptica y colitis ulcerosa  [place] el medicamento para zuleta afección gabbie se le indique  · Limite o no tome SUZANNE  Pregúntele a zuleta médico si es seguro que usted tome [place]  [place] pueden aumentar el riesgo de úlceras y sangrado gastrointestinal     · No consuma alcohol  El alcohol puede causar úlceras y várices esofágicas  Marzella Life son hinchazón de los vasos sanguíneos en el esófago  Con el tiempo los vasos sanguíneos se debilitan y pueden sangrar  · No fume  La nicotina y otros químicos de los cigarrillos y cigarros pueden aumentar el riesgo de [place]  Pida información a zuleta médico si usted actualmente fuma y necesita ayuda para dejar de fumar  Los cigarrillos electrónicos o tabaco sin humo todavía contienen nicotina  Consulte con zuleta médico antes de [employer]  Acuda a holland consultas de control con zuleta médico según le indicaron  Es posible que deba regresar para que le sharon tootie colonoscopía, endoscopía y [place]  Estas pruebas pueden hacer que no tenga más sangrado  Anote holland preguntas para que se acuerde de hacerlas james holland visitas  © 2017 [address] [facility] Information is for End User's use only and may not be sold, redistributed or otherwise used for commercial purposes  All illustrations and images included in CareNotes® are the copyrighted property of A D A M , Inc  or Chidi Rodriguez  Esta información es sólo para uso en educación  Zuleta intención no es darle un consejo médico sobre enfermedades o tratamientos  Colsulte con zuleta Alben Maddy farmacéutico antes de seguir cualquier régimen médico para saber si es seguro y efectivo para usted              Please call Dr Jacklyn Ferrer office when discharged to set up an appointment to review your hospitalization and discuss need for colonoscopy and EGD  If you start to have large amounts of blood per rectum, feel lightheaded, dizzy, heart palpations, SOB please return to the ED immediately for evaluation  Diverticulitis   LO QUE NECESITA SABER:   La diverticulitis es tootie condición que provoca que bolsas pequeñas a lo aspen de emelia intestinos que se conocen gabbie divertículos, se inflamen o se infecten  [place] se produce a causa de alimentos o tootie evacuación intestinal dura, o bacteria que se atasca en las bolsas  INSTRUCCIONES SOBRE EL NATHAN HOSPITALARIA:   Regrese a la carmita de emergencias si:   · Usted presenta evacuación intestinal o flujo vaginal con un aroma desagradable que gotea de hernandez vagina o se encuentra en hernandez orina  · Usted tiene diarrea severa  · Usted orina menos de lo normal o no orina nada en absoluto  · Usted es incapaz de realizar tootie evacuación intestinal     · Usted no puede dejar de vomitar  · Usted tiene dolor abdominal severo, fiebre y hernandez abdomen está más elizabeth de lo usual      · Usted nota pushpa por primera vez o un aumento de pushpa en emelia evacuaciones intestinales  Pregúntele a hernandez Claryce Levans vitaminas y minerales son adecuados para usted  · Siente dolor al [facility]  · Emelia síntomas empeoran o no desaparecen  · Usted tiene preguntas o inquietudes acerca de hernandez condición o cuidado  Medicamentos:   · Antibióticos  se los podrían administrar para ayudar a tratar tootie infección bacteriana  · Un medicamento con receta para el dolor  podrían ser Joo [place]t  Pregunte al médico cómo debe guillermo tamia medicamento de forma hearn  Algunos medicamentos recetados para el dolor contienen acetaminofén  No tome otros medicamentos que contengan acetaminofén sin consultarlo con hernandez médico  Demasiado acetaminofeno puede causar daño al hígado  Los medicamentos recetados para el dolor podrían causar estreñimiento  Pregunte a hernandez médico gabbie prevenir o tratar estreñimiento       · [facility] medicamentos gabbie se le haya indicado  Consulte con hernandez médico si usted pooja que hernandez medicamento no le está ayudando o si presenta efectos secundarios  Infórmele si es alérgico a cualquier medicamento  Mantenga tootie lista actualizada de los [place], las vitaminas y los productos herbales que nathaniel  Incluya los siguientes datos de los medicamentos: cantidad, frecuencia y motivo de administración  Traiga con usted la lista o los envases de la píldoras a holland citas de seguimiento  Lleve la lista de los medicamentos con usted en nazanin de tootie emergencia  Dieta de líquidos blanka:  Dianna tipo de dieta incluye líquidos transparentes  Por ejemplo, agua, soda de jengibre, jugo de [place] o [place], [place] de frutas congelado o consomé  Continúe con hernandez dieta de líquidos blanka hasta que holland síntomas desaparezcan o según indicaciones  Acuda a holland consultas de control con hernandez médico según le indicaron  Es posible que usted deba regresar a que le realicen tootie colonoscopía  [place] vez que holland síntomas hayan desaparecido, probablemente necesitará consumir tootie dieta baja en grasas y mayito en fibras para evitar volver a desarrollar la diverticulitis  Hernandez médico o dietista puede ayudarle a crear planes de comida  Anote holland preguntas para que se acuerde de hacerlas james holland visitas  © 2017 [address] Carlos Evans Information is for End User's use only and may not be sold, redistributed or otherwise used for commercial purposes  All illustrations and images included in CareNotes® are the copyrighted property of A D A M , Inc  or Chidi Rodriguez  Esta información es sólo para uso en educación  Hernandez intención no es darle un consejo médico sobre enfermedades o tratamientos  Colsulte con hernandez [place] farmacéutico antes de seguir cualquier régimen médico para saber si es seguro y efectivo para usted            Dieta para la diverticulosis   LO QUE USTED DEBE SABER:   La diverticulosis es tootie condición en la que se manuel bolsas pequeñas llamadas divertículos en el colon (conocido también gabbie intestino grueso)  Estas bolsas dificultan el paso de las heces por el sistema digestivo  Los expertos creen que la diverticulosis podría ser el resultado de seguir tootie dieta demasiado baja en fibra a lo aspen de la michael  INSTRUCCIONES:   Dieta que debe seguir:  Es posible que no presente síntomas, [place] aún así debería seguir tootie  dieta mayito en fibra  para evitar que la diverticulosis empeore  · Coma alimentos con un alto contenido de fibra para ayudar a tener evacuaciones intestinales regulares  La fibra adicional podría contribuir a disminuir hernandez riesgo de que se formen nuevos divertículos y que le venga diverticulitis  La diverticulitis es tootie enfermedad dolorosa que ocurre cuando las bolsas que se manuel en el colon se inflaman o infectan  · La mayoría de los adultos necesitan entre 20 y 28 gramos de fibra al día  Si tiene diverticulosis, es posible que deba consumir entre 6 y 8 gramos más de matt cantidad a diario  Pregunte a hernandez médico o dietista cuánta fibra debe consumir  Aumente hernandez consumo de fibra lentamente  Es posible que se sienta hinchado y tenga gas cuando coma más [place]  Podría tener que guillermo un suplemento de fibra si los alimentos que consume no tienen suficiente fibra  Alicia por lo menos 2 o 3 litros (8 a 12 vasos) de líquido al día a medida que incluye más fibra en hernandez dieta    Alimentos que tienen un alto contenido de fibra:   · Alimentos que contienen al menos 4 gramos de fibra por porción:      ¨ ? a ½ taza de cereal alto en fibra (consulte la etiqueta de información sobre la nutrición que viene en la caja)     ¨ ½ taza de moras o frambuesas    ¨ 4 ciruelas secas    ¨ 1 alcachofa cocida    ¨ ½ taza de legumbres cocidas, gabbie lentejas o frijoles rojos o pintos    · Emitless contienen 1 a 3 gramos de [place] por porción:      ¨ 1 rebanada de pan de batool integral, pan integral de zurita o pan de zurita    ¨ 4 galletas integrales    ¨ ½ taza de cereal con 1 a 3 gramos de Arabella por porción (consulte la etiqueta de información sobre la nutrición que viene en la [place])    ¨ 1 trozo de fruta, gabbie Ryan cabrera, [place], [facility] o [address] [place] [address] 3 dátiles    ¨ ½ taza de albaricoques enlatados, ensalada de frutas, duraznos o peras    ¨ ½ taza de verduras crudas o cocidas, gabbie zanahorias, coliflor, repollo, espinaca, calabaza o maíz  Comuníquese con hernandez médico de cabecera si:   · Tiene alguna pregunta sobre tootie Jose Fine con un alto contenido de Arabella  · Nota un cambio en holland evacuaciones intestinales  · Siente molestia estomacal      · Tiene fiebre  · Siente dolor en el lado murphy de la parte inferior del abdomen  · Tiene alguna pregunta acerca de hernandez condición o cuidado  Regrese a la carmita de emergencia si:   · Tiene diarrea con pushpa  · Olinda Form mucho el abdomen  © 2014 [address] is for End User's use only and may not be sold, redistributed or otherwise used for commercial purposes  All illustrations and images included in CareNotes® are the copyrighted property of A D A M , Inc  or [facility]  Esta información es sólo para uso en educación  Hernandez intención no es darle un consejo médico sobre enfermedades o tratamientos  Colsulte con hernandez Danita Dieudonne farmacéutico antes de seguir cualquier régimen médico para saber si es seguro y efectivo para usted

## 2017-11-13 NOTE — PROGRESS NOTES
Progress Note - Marie Moore 81 y o  male MRN: [id number]    Unit/Bed#: [facility] 605-01 Encounter: [id number]        BRBPR (bright red blood per rectum)   Assessment & Plan    · Patient presents with melena at home with several episodes in the Emergency Room  He denies recent weight loss  He states that he had a normal bowel movement the morning of admission and it wasn't until 4PM that he noticed dark colored stool  · Patient denies any history of GI bleed  · Last  Colonoscopy was "several years ago" per patient  · Patient states he had approximately 5 dark loose stools overnight of admission on 11/10  · Admission hemoglobin 14 3, now 11 6 this AM  · Still continues with one loose dark stool this morning  He states that his diarrhea has improved and he only went once overnight  · Will order CBC for the morning  · Type and screen no longer active but hemoglobin has stabilized at this point  · Patient to follow with Dr Kimberly Salas, Colorectal as an outpatient  · GI consulted and performed EGD on 11/10 which showed no ulcers but areas of erosions that are unlikely source of bleeding  Plan for colonoscopy today 11/13  Patient had bowel prep starting last night but states that he is not yet clear  RN states that patient also had mag citrate this morning at 0600 and will continue to give Golytely  · Continue protonix IV q12  · Continue fiber diet   · ASA/Meloxicam on hold at this time given GI bleed  · Hemodynamically stable           Diabetes 1 5, managed as type 2 ([facility]   Assessment & Plan    · Takes Januvia, amaryl and metformin at home, on hold  · Creatinine 1 01 today  · HA1C most recently 6 6 on 8/11/17  · Now on SSI, BG moderately controlled   · Patient was refusing insulin from nursing, blood sugars are elevated  · States that he does not want to be hooked on insulin    · I have discussed the fact that we take patient is off oral hypoglycemics and that we are just use insulin acutely  · He has agreement to follow through with having coverage for his elevated sugars at this time will trend now accepting insulin  · NPO for procedure, will resume diabetic/high fiber diet post colonoscopy  · Resume home regimen at discharge          Leukocytosis   Assessment & Plan    · Leukocytosis resolved  · WBC 16 35 on admission, down to 7 44  today  · Will continue to trend WBC   · Patient afebrile, will continue to trend            * Melena   Assessment & Plan    · Pt took a photo of his stool  still with bright red blood but mixed with soft formed stool /melena   · States that stools are becoming less frequent but that they are still dark red in color              VTE Pharmacologic Prophylaxis:   Pharmacologic: contraindicated in light of GI bleed  Mechanical VTE Prophylaxis in Place: Yes    Patient Centered Rounds: I have performed bedside rounds with nursing staff today  Discussions with Specialists or Other Care Team Provider: nursing    Education and Discussions with Family / Patient: patient    Time Spent for Care: 30 minutes  More than 50% of total time spent on counseling and coordination of care as described above  Current Length of Stay: 2 day(s)    Current Patient Status: Inpatient   Certification Statement: The patient will continue to require additional inpatient hospital stay due to GI bleed, colonoscopy today    Discharge Plan: discharge home when medically stable    Code Status: Level 1 - Full Code      Subjective:   Patient states that he has been taking the bowel prep as directed but that his stools are not yet clear  Stools are dark maroon in color  He denies any pain, abdominal discomfort or tenderness  Objective:     Vitals:   Temp (24hrs), Av 9 °F (36 6 °C), Min:97 5 °F (36 4 °C), Max:98 2 °F (36 8 °C)    HR:  [65-85] 65  Resp:  [18-20] 20  BP: ()/(55-72) 97/55  SpO2:  [96 %-99 %] 99 %  Body mass index is 27 96 kg/m²  Input and Output Summary (last 24 hours):        Intake/Output Summary (Last 24 hours) at 11/13/17 0816  Last data filed at 11/13/17 0539   Gross per 24 hour   Intake             1200 ml   Output              100 ml   Net             1100 ml       Physical Exam:     Physical Exam   Constitutional: He is oriented to person, place, and time  He appears well-developed and well-nourished  No distress  HENT:   Head: Normocephalic and atraumatic  Mouth/Throat: No oropharyngeal exudate  Eyes: Conjunctivae are normal  Pupils are equal, round, and reactive to light  Right eye exhibits no discharge  Left eye exhibits no discharge  No scleral icterus  Neck: Normal range of motion  Neck supple  No JVD present  No tracheal deviation present  No thyromegaly present  Cardiovascular: Normal rate, regular rhythm and normal heart sounds  Exam reveals no gallop and no friction rub  No murmur heard  Pulmonary/Chest: Effort normal and breath sounds normal  No respiratory distress  He has no wheezes  He has no rales  Abdominal: Soft  Bowel sounds are normal  He exhibits no distension  There is no tenderness  There is no rebound  Musculoskeletal: Normal range of motion  He exhibits no edema, tenderness or deformity  Neurological: He is alert and oriented to person, place, and time  No cranial nerve deficit  Coordination normal    Skin: Skin is warm and dry  No rash noted  He is not diaphoretic  No erythema  No pallor  Psychiatric: He has a normal mood and affect   His behavior is normal        Additional Data:     Labs:      Results from last 7 days  Lab Units 11/13/17  0538   WBC Thousand/uL 7 44   HEMOGLOBIN g/dL 11 6*   HEMATOCRIT % 34 1*   PLATELETS Thousands/uL 284   NEUTROS PCT % 47   LYMPHS PCT % 32   MONOS PCT % 10   EOS PCT % 9*       Results from last 7 days  Lab Units 11/13/17  0538  11/09/17  1937   SODIUM mmol/L 139  < > 138   POTASSIUM mmol/L 4 0  < > 4 0   CHLORIDE mmol/L 101  < > 104   CO2 mmol/L 31  < > 26   BUN mg/dL 13  < > 15   CREATININE mg/dL 1 01  < > 0 82 CALCIUM mg/dL 8 4  < > 8 1*   TOTAL PROTEIN g/dL  --   --  6 4   BILIRUBIN TOTAL mg/dL  --   --  0 39   ALK PHOS U/L  --   --  89   ALT U/L  --   --  20   AST U/L  --   --  11   GLUCOSE RANDOM mg/dL 173*  < > 200*   < > = values in this interval not displayed  Results from last 7 days  Lab Units 11/13/17  0538   INR  1 17*       * I Have Reviewed All Lab Data Listed Above  * Additional Pertinent Lab Tests Reviewed: All Labs Within Last 24 Hours Reviewed    Imaging:    Imaging Reports Reviewed Today Include: Reviewed      Recent Cultures (last 7 days):           Last 24 Hours Medication List:     azelastine 2 spray Each Nare Daily   cholecalciferol 1,000 Units Oral Daily   finasteride 5 mg Oral Daily   fish oil 1,000 mg Oral Daily   fluticasone 1 spray Nasal Daily   insulin lispro 1-5 Units Subcutaneous HS   insulin lispro 1-6 Units Subcutaneous TID AC   lisinopril-hydrochlorothiazide (PRINZIDE 20/12  5) combo dose  Oral Daily   montelukast 10 mg Oral HS   multivitamin-minerals 1 tablet Oral Daily   pantoprazole 40 mg Intravenous Q12H [facility]        Today, Patient Was Seen By: INOCENCIO Rogers    ** Please Note: Dictation voice to text software may have been used in the creation of this document   **

## 2017-11-16 ENCOUNTER — ALLSCRIPTS OFFICE VISIT (OUTPATIENT)
Dept: OTHER | Facility: OTHER | Age: 72
End: 2017-11-16

## 2017-11-19 ENCOUNTER — GENERIC CONVERSION - ENCOUNTER (OUTPATIENT)
Dept: OTHER | Facility: OTHER | Age: 72
End: 2017-11-19

## 2017-11-20 ENCOUNTER — APPOINTMENT (OUTPATIENT)
Dept: LAB | Facility: HOSPITAL | Age: 72
End: 2017-11-20
Attending: UROLOGY
Payer: COMMERCIAL

## 2017-11-20 DIAGNOSIS — N20.0 CALCULUS OF KIDNEY: ICD-10-CM

## 2017-11-20 DIAGNOSIS — N40.0 ENLARGED PROSTATE WITHOUT LOWER URINARY TRACT SYMPTOMS (LUTS): ICD-10-CM

## 2017-11-20 LAB — PSA SERPL-MCNC: 0.9 NG/ML (ref 0–4)

## 2017-11-20 PROCEDURE — 36415 COLL VENOUS BLD VENIPUNCTURE: CPT

## 2017-11-20 PROCEDURE — 84153 ASSAY OF PSA TOTAL: CPT

## 2017-11-21 ENCOUNTER — GENERIC CONVERSION - ENCOUNTER (OUTPATIENT)
Dept: OTHER | Facility: OTHER | Age: 72
End: 2017-11-21

## 2017-11-30 ENCOUNTER — ALLSCRIPTS OFFICE VISIT (OUTPATIENT)
Dept: OTHER | Facility: OTHER | Age: 72
End: 2017-11-30

## 2017-12-01 ENCOUNTER — ALLSCRIPTS OFFICE VISIT (OUTPATIENT)
Dept: OTHER | Facility: OTHER | Age: 72
End: 2017-12-01

## 2017-12-05 NOTE — PROGRESS NOTES
Assessment    1  Lateral epicondylitis of right elbow (726 32) (M77 11)   2  Chronic pain of both shoulders (719 41,338 29) (M25 511,M25 512,G89 29)   3  Impingement syndrome of right shoulder (726 2) (M75 41)    Plan  Chronic pain of both shoulders    · Follow-up visit in 2 months Evaluation and Treatment  Follow-up  Status: Complete   Done: [id number]  Impingement syndrome of right shoulder, Lateral epicondylitis of right elbow    · *1 - [facility]  Lateral epicondylitis right elbow  impingement syndrome right shoulder  2 times a week for 6 weeks  local modalities at therapist discretion  ROM right shoulder and elbow  Elbow and shoulder stretching   rotator cuff strengthening right shoulder  home program  Status: Active  Requested for: 11KJX7582  Care Summary provided  : Yes    Discussion/Summary    The patient has exam consistent with lateral epicondylitis of his right elbow, his bilateral shoulder symptoms are being well controlled and does not require further treatment for those today  As for his right elbow I am recommending and providing him with referral to physical/occupational therapy for rehabilitation and follow up in 6 to 8 weeks, an injection could be considered at that time if necessary  History of Present Illness  HPI: Patient returns to the office in follow-up of his right shoulder  He notes increasing right elbow pain since making his appointment today  He has not had any therapy for the elbow or shoulder recently  He denies numbness or tingling  He denies injury or trauma  He is having difficulty lifting anything with weight with the right arm  Pain occurs at the elbow and shoulder at night  Right shoulder is less painful than the right elbow  He denies numbness or tingling  Review of Systems    Constitutional: No fever or chills, feels well, no tiredness, no recent weight loss or weight gain     Cardiovascular: No complaints of chest pain, no palpitations, no leg claudication or lower extremity edema  Respiratory: No complaints of shortness of breath, no wheezing, no cough  Gastrointestinal: No complaints of abdominal pain, no constipation, no nausea or vomiting, no diarrhea or bloody stools  Musculoskeletal: as noted in HPI  ROS reviewed  Active Problems    1  Acute blood loss anemia (285 1) (D62)   2  Allergic rhinitis (477 9) (J30 9)   3  Atypical nevi (216 9) (D22 9)   4  Bright red blood per rectum (569 3) (K62 5)   5  Chronic pain of both shoulders (719 41,338 29) (M25 511,M25 512,G89 29)   6  Chronic rhinitis (472 0) (J31 0)   7  DMII (diabetes mellitus, type 2) (250 00) (E11 9)   8  Enlarged prostate without lower urinary tract symptoms (luts) (600 00) (N40 0)   9  Generalized osteoarthritis (715 00) (M15 9)   10  H  pylori infection (041 86) (A04 8)   11  Hypertension (401 9) (I10)   12  Left facial numbness (782 0) (R20 0)   13  Left facial numbness (782 0) (R20 0)   14  Low back pain (724 2) (M54 5)   15  Male erectile dysfunction (607 84) (N52 9)   16  Need for vaccination (V05 9) (Z23)   17  Parotid swelling (782 3) (R60 9)   18  Rectal bleeding (569 3) (K62 5)   19   Sjogren's syndrome (710 2) (M35 00)    Past Medical History    · History of Abrasion of left cornea (918 1) (S05 02XA)   · Acute bronchitis (466 0) (J20 9)   · History of Acute Lymphadenitis (683)   · History of Acute serous otitis media, unspecified laterality   · History of Acute upper respiratory infection (465 9) (J06 9)   · History of Aftercare following surgery (V58 89) (Z48 89)   · History of Benign colon polyp (211 3) (K63 5)   · 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)   · History of Benign positional vertigo (386 11) (H81 10)   · History of Bilateral leg edema (782 3) (R60 0)   · Cough (786 2) (R05)   · History of Diabetes Mellitus (250 00)   · History of Diverticulitis of colon (562 11) (K57 32)   · History of Dry skin dermatitis (057 89) (L85 3)   · History of Enlarged parotid gland (527 1) (K11 1)   · History of Fatty liver (571 8) (K76 0)   · History of Hematospermia (608 82) (R36 1)   · History of High risk medication use (V58 69) (Z79 899)   · History of Bell's palsy (V12 49) (Z86 69)   · History of chronic sinusitis (V12 69) (Z87 09)   · History of dizziness (V13 89) (V43 293)   · History of hemorrhoids (V13 89) (Z87 19)   · History of hyperlipidemia (V12 29) (Z86 39)   · History of pneumonia (V12 61) (Z87 01)   · History of pneumonia (V12 61) (Z87 01)   · History of renal calculi (V13 01) (Z87 442)   · History of Hordeolum externum of left eye (373 11) (H00 016)   · History of Medicare annual wellness visit, initial (V70 0) (Z00 00)   · History of Need for influenza vaccination (V04 81) (Z23)   · History of Need for vaccination (V05 9) (Z23)   · History of Otitis media of right ear (382 9) (H66 91)   · History of Pain in joint of left shoulder (719 41) (M25 512)   · History of Pain of left heel (729 5) (M79 672)   · History of Periorbital cellulitis of left eye (682 0) (L03 213)   · History of Pharyngitis (462) (J02 9)   · History of Rotator cuff tendinitis, left (726 10) (M75 82)   · History of Scrotal disorder (608 9) (N50 9)   · History of Trigger finger, left (727 03) (M65 30)   · History of Upper respiratory infection (465 9) (J06 9)    The active problems and past medical history were reviewed and updated today  Surgical History    · History of Complete Colonoscopy   · History of Hernia Repair    The surgical history was reviewed and updated today  Family History  Mother    · No pertinent family history  Father    · Family history of Diabetes mellitus   · Family history of Hypertension   · Family history of Kidney disease  Son    · Family history of Diabetes mellitus    The family history was reviewed and updated today         Social History    · No drug use   · Nonsmoker (V49 89) (Z78 9)   · Occasional alcohol use   · Occasional caffeine consumption  The social history was reviewed and updated today  Current Meds   1  AmLODIPine Besylate 10 MG Oral Tablet; take 1 tablet by mouth every day; Therapy: 67SXZ7623 to (Evaluate:95Qmw3801)  Requested for: 35NPI1276; Last   Rx:28Jun2017 Ordered   2  Amoxicillin 500 MG Oral Capsule; TAKE 2 CAPSULES TWICE DAILY UNTIL GONE;   Therapy: 72APF9785 to (Hobert Carrier)  Requested for: 21Nov2017; Last   Rx:21Nov2017 Ordered   3  Artificial Tears 0 4 % SOLN; INSTILL 1-2 DROPS INTO THE AFFECTED EYE(S) EVERY   6 HOURS; Therapy: 72RUJ8757 to (Last Rx:11Nov2015)  Requested for: 19IYH4282 Ordered   4  Ascensia Autodisc Test DISK; test bid; Therapy: 25Isw4656 to (96 267900)  Requested for: 99NGY8821; Last   EN:00RQD2111 Ordered   5  Azelastine HCl - 0 15 % Nasal Solution; USE 2 SPRAYS TWICE DAILY AS DIRECTED; Therapy: 47Axm1934 to (Evaluate:11Aug2017)  Requested for: 12Jun2017; Last   Rx:12Jun2017 Ordered   6  Cialis 20 MG Oral Tablet; take as directed; Therapy: 12RCB3990 to (Keanu High)  Requested for: 32UUN1098; Last   GK:90JVS5999 Ordered   7  Clarithromycin 500 MG Oral Tablet; TAKE 1 TABLET EVERY 12 HOURS DAILY; Therapy: 24ADD7414 to (Hobert Carrier)  Requested for: 34ZLG3717; Last   Rx:21Nov2017 Ordered   8  Finasteride 5 MG Oral Tablet; TAKE 1 TABLET DAILY AS DIRECTED; Therapy: 40RDO2009 to (Reuben Rinne)  Requested for: 86TWT5054; Last   Rx:07Nov2017 Ordered   9  Fish Oil 1000 MG Oral Capsule Recorded   10  Fluticasone Propionate 50 MCG/ACT Nasal Suspension; USE 2 SPRAYS IN EACH    NOSTRIL EVERY DAY; Therapy: 93KIA6219 to (Reuben Rinne)  Requested for: 30BTN5692; Last    Rx:29Mar2017 Ordered   11  Glimepiride 2 MG Oral Tablet; take 2 tablets by mouth twice daily; Therapy: 18Igp7651 to (Evaluate:06Apr2018)  Requested for: 32FPY5231; Last    Rx:08Oct2017 Ordered   12  Januvia 25 MG Oral Tablet; TAKE 1 TABLET DAILY;     Therapy: 59ZRO8076 to (Jennifer Genao)  Requested for: 03Apr2017; Last    Rx:03Apr2017 Ordered   13  Lisinopril-Hydrochlorothiazide 20-12 5 MG Oral Tablet; take 1 tablet by mouth twice    daily; Therapy: 65NZK2212 to (Chanell Herrera)  Requested for: 64OLZ0748; Last    Rx:30Mar2017 Ordered   14  Meclizine HCl - 25 MG Oral Tablet; 1 tablet every 8 hrs for 3 day the q 8 hrs prn    vertigo; Therapy: 95HUM0035 to (Last Rx:28Mar2017)  Requested for: 28Mar2017 Ordered   15  MetFORMIN HCl - 1000 MG Oral Tablet; take one tablet by mouth twice daily; Therapy: 44OIL8643 to (Jaja Crockett)  Requested for: 19VIX3598; Last    Rx:06Nov2017 Ordered   16  Montelukast Sodium 10 MG Oral Tablet; Take 1 tablet by mouth at bedtime; Therapy: 50YSV1381 to (Evaluate:86Ubs1204)  Requested for: 78VDC8313; Last    Rx:28Jun2017 Ordered   17  Multi Vitamin/Minerals Oral Tablet Recorded   18  Multiple Vitamins Oral Tablet; Take one daily; Therapy: 46ROF2678 to (Last Rx:13Jan2017) Ordered   19  Omeprazole 20 MG Oral Capsule Delayed Release; TAKE 1 CAPSULE TWICE DAILY; Therapy: 29OTI9235 to (Jerome Watts)  Requested for: 81IYO1782; Last    Rx:21Nov2017 Ordered   20  One Touch Ultra (Devices) MISC; Patient tests twice daily; Therapy: (Recorded:29Nov2016) to Recorded   21  OneTouch Delica Lancets 49X Miscellaneous; TEST TWICE A DAY  Requested for:    69FYD4164; Last Rx:29Nov2016 Ordered   22  OneTouch Ultra Blue In [employer]; tests twice daily  Requested for: 31CGE2381; Last    Rx:29Nov2016 Ordered   23  Vitamin D 1000 UNIT Oral Tablet Recorded    The medication list was reviewed and updated today  Allergies    1  Iodinated Contrast Media    2  IV Dye   3   Other    Vitals   Recorded: 48ZFK1792 02:16PM   Heart Rate 94   Systolic 552, Sitting   Diastolic 82, Sitting   Weight 177 lb 6 oz   BMI Calculated 29 52   BSA Calculated 1 88     Physical Exam    Constitutional - General appearance: Normal    Musculoskeletal - Muscle strength/tone: Normal  Upper extremity compartments: Normal       Future Appointments    Date/Time Provider Specialty Site   01/25/2018 10:50 USAMA [place]   Orthopedic Surgery [facility]   12/01/2017 01:15 PM Shabnam Lin, Gee Torres  Urology [facility] Farrah Leija     Signatures   Electronically signed by : USAMA Dumont ; Nov 30 2017  3:59PM EST                       (Author)

## 2017-12-06 ENCOUNTER — ALLSCRIPTS OFFICE VISIT (OUTPATIENT)
Dept: OTHER | Facility: OTHER | Age: 72
End: 2017-12-06

## 2017-12-06 NOTE — PROGRESS NOTES
Assessment    1  Enlarged prostate without lower urinary tract symptoms (luts) (600 00) (N40 0)   2  Male erectile dysfunction (607 84) (N52 9)   3  Calculus of kidney (592 0) (N20 0)    Plan  Enlarged prostate without lower urinary tract symptoms (luts)    · Follow-up PRN Evaluation and Treatment  Follow-up  Status: Complete  Done:[id number]   Ordered;Enlarged prostate without lower urinary tract symptoms (luts); Ordered By: Lacy Roblero Performed:  Due: 61ADI7517    Discussion/Summary  Discussion Summary:   BPH, history of nephrolithiasis, prostate cancer screening  is a 80 y/o male being managed by Dr Kelley Nolasco  He is doing well with no urinary complaints  PSA remains stable at 0 9 (2 8 corrected with finasteride)  Will continue to take finasteride daily  We discussed that based on a UA guidelines we no longer need to continue with prostate cancer screening since his PSA remains stable and he he is 81years old  He is agreeable  Refills for his finasteride can be provided by his PCP  He can follow up on an as-needed basis with our office  He was instructed to call with any worsening urinary issues or new concerns  All questions answered  Chief Complaint  Chief Complaint Free Text Note Form: BPH, ED, nephrolithiasis      History of Present Illness  HPI: 74-year-old male with BPH and history of kidney stones presents today for 1 year follow-up  He continues to take finasteride daily  He denies any lower urinary tract symptoms except for occasional hesitancy and nocturia 1 time a night  Denies any recent stone episodes or gross hematuria  He does note that he was recently admitted for rectal bleeding  He underwent a full evaluation  He is currently being treated with antibiotics  He denies any other changes in his overall health  Review of Systems  Complete-Male Urology:  Constitutional: No fever or chills, feels well, no tiredness, no recent weight gain or weight loss    Respiratory: No complaints of shortness of breath, no wheezing, no cough, no SOB on exertion, no orthopnea or PND  Cardiovascular: No complaints of slow heart rate, no fast heart rate, no chest pain, no palpitations, no leg claudication, no lower extremity  Gastrointestinal: No complaints of abdominal pain, no constipation, no nausea or vomiting, no diarrhea or bloody stools  Genitourinary: urinary hesitancy,-- Empty sensation-- and-- stream quality good, but-- no dysuria,-- no hematuria,-- no incontinence,-- no nocturia-- and-- no feelings of urinary urgency  Musculoskeletal: No complaints of arthralgia, no myalgias, no joint swelling or stiffness, no limb pain or swelling  Integumentary: No complaints of skin rash or skin lesions, no itching, no skin wound, no dry skin  Hematologic/Lymphatic: No complaints of swollen glands, no swollen glands in the neck, does not bleed easily, no easy bruising  Neurological: No compliants of headache, no confusion, no convulsions, no numbness or tingling, no dizziness or fainting, no limb weakness, no difficulty walking  ROS Reviewed:   ROS reviewed  Active Problems  1  Acute blood loss anemia (285 1) (D62)   2  Allergic rhinitis (477 9) (J30 9)   3  Atypical nevi (216 9) (D22 9)   4  Bright red blood per rectum (569 3) (K62 5)   5  Calculus of kidney (592 0) (N20 0)   6  Chronic pain of both shoulders (719 41,338 29) (M25 511,M25 512,G89 29)   7  Chronic rhinitis (472 0) (J31 0)   8  DMII (diabetes mellitus, type 2) (250 00) (E11 9)   9  Enlarged prostate without lower urinary tract symptoms (luts) (600 00) (N40 0)   10  Generalized osteoarthritis (715 00) (M15 9)   11  H  pylori infection (041 86) (A04 8)   12  Hypertension (401 9) (I10)   13  Impingement syndrome of right shoulder (726 2) (M75 41)   14  Lateral epicondylitis of right elbow (726 32) (M77 11)   15  Left facial numbness (782 0) (R20 0)   16  Left facial numbness (782 0) (R20 0)   17  Low back pain (724 2) (M54 5)   18   Male erectile dysfunction (607 84) (N52 9)   19  Need for vaccination (V05 9) (Z23)   20  Parotid swelling (782 3) (R60 9)   21  Rectal bleeding (569 3) (K62 5)   22  Sjogren's syndrome (710 2) (M35 00)    Past Medical History    1  History of Abrasion of left cornea (918 1) (S05 02XA)   2  Acute bronchitis (466 0) (J20 9)   3  History of Acute Lymphadenitis (683)   4  History of Acute serous otitis media, unspecified laterality   5  History of Acute upper respiratory infection (465 9) (J06 9)   6  History of Aftercare following surgery (V58 89) (Z48 89)   7  History of Benign colon polyp (211 3) (K63 5)   8  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)   9  History of Benign positional vertigo (386 11) (H81 10)   10  History of Bilateral leg edema (782 3) (R60 0)   11  Cough (786 2) (R05)   12  History of Diabetes Mellitus (250 00)   13  History of Diverticulitis of colon (562 11) (K57 32)   14  History of Dry skin dermatitis (692 89) (L85 3)   15  History of Enlarged parotid gland (527 1) (K11 1)   16  History of Fatty liver (571 8) (K76 0)   17  History of Hematospermia (608 82) (R36 1)   18  History of High risk medication use (V58 69) (Z79 899)   19  History of Bell's palsy (V12 49) (Z86 69)   20  History of chronic sinusitis (V12 69) (Z87 09)   21  History of dizziness (V13 89) (Z87 898)   22  History of hemorrhoids (V13 89) (Z87 19)   23  History of hyperlipidemia (V12 29) (Z86 39)   24  History of pneumonia (V12 61) (Z87 01)   25  History of pneumonia (V12 61) (Z87 01)   26  History of Hordeolum externum of left eye (373 11) (H00 016)   27  History of Medicare annual wellness visit, initial (V70 0) (Z00 00)   28  History of Need for influenza vaccination (V04 81) (Z23)   29  History of Need for vaccination (V05 9) (Z23)   30  History of Otitis media of right ear (382 9) (H69 91)   31  History of Pain in joint of left shoulder (719 41) (M25 512)   32   History of Pain of left heel (729 5) (M79 672)   33  History of Periorbital cellulitis of left eye (682 0) (L03 213)   34  History of Pharyngitis (462) (J02 9)   35  History of Rotator cuff tendinitis, left (726 10) (M75 82)   36  History of Scrotal disorder (608 9) (N50 9)   37  History of Trigger finger, left (727 03) (M65 30)   38  History of Upper respiratory infection (465 9) (J06 9)  Active Problems And Past Medical History Reviewed: The active problems and past medical history were reviewed and updated today  Surgical History  1  History of Complete Colonoscopy   2  History of Hernia Repair  Surgical History Reviewed: The surgical history was reviewed and updated today  Family History  Mother    1  No pertinent family history  Father    2  Family history of Diabetes mellitus   3  Family history of Hypertension   4  Family history of Kidney disease  Son    5  Family history of Diabetes mellitus  Family History Reviewed: The family history was reviewed and updated today  Social History     · No drug use   · Nonsmoker (V49 89) (Z78 9)   · Occasional alcohol use   · Occasional caffeine consumption  Social History Reviewed: The social history was reviewed and updated today  The social history was reviewed and is unchanged  Current Meds   1  AmLODIPine Besylate 10 MG Oral Tablet; take 1 tablet by mouth every day; Therapy: 24IKU0640 to (Evaluate:10Ovi7664)  Requested for: 15TUW2436; Last Rx:28Jun2017 Ordered   2  Amoxicillin 500 MG Oral Capsule; TAKE 2 CAPSULES TWICE DAILY UNTIL GONE; Therapy: 89GBR6283 to (Ashly Reese)  Requested for: 21Nov2017; Last Rx:21Nov2017 Ordered   3  Artificial Tears 0 4 % SOLN; INSTILL 1-2 DROPS INTO THE AFFECTED EYE(S) EVERY 6 HOURS; Therapy: 47SXR1316 to (Last Rx:11Nov2015)  Requested for: 21AMN2205 Ordered   4  Ascensia Autodisc Test DISK; test bid; Therapy: 80Vuq2946 to [phone])  Requested for: 26ZMB4654; Last YL:46QFY7501 Ordered   5   Azelastine HCl - 0 15 % Nasal Solution; USE 2 SPRAYS TWICE DAILY AS DIRECTED; Therapy: 89Fhs2251 to (Evaluate:11Aug2017)  Requested for: 12Jun2017; Last Rx:12Jun2017 Ordered   6  Cialis 20 MG Oral Tablet; take as directed; Therapy: 38LRR7756 to (Fatimah Coleman)  Requested for: 09OSG1244; Last LV:31HEF4059 Ordered   7  Clarithromycin 500 MG Oral Tablet; TAKE 1 TABLET EVERY 12 HOURS DAILY; Therapy: 53QRL6965 to (Nathanael Mercedes)  Requested for: 96ERP0688; Last Rx:21Nov2017 Ordered   8  Finasteride 5 MG Oral Tablet; TAKE 1 TABLET DAILY AS DIRECTED; Therapy: 60YLI8547 to (Bay Torres)  Requested for: 52CCR1045; Last Rx:07Nov2017 Ordered   9  Fish Oil 1000 MG Oral Capsule Recorded   10  Fluticasone Propionate 50 MCG/ACT Nasal Suspension; USE 2 SPRAYS IN EACH  NOSTRIL EVERY DAY; Therapy: 10RGN6320 to (Janes Santiago)  Requested for: 93ZMT0754; Last  Rx:29Mar2017 Ordered   11  Glimepiride 2 MG Oral Tablet; take 2 tablets by mouth twice daily; Therapy: 36Kbo0334 to (Evaluate:06Apr2018)  Requested for: 61AMP9136; Last  Rx:08Oct2017 Ordered   12  Januvia 25 MG Oral Tablet; TAKE 1 TABLET DAILY; Therapy: 76GTO8262 to (Dallas Mclaughlin)  Requested for: 03Apr2017; Last  Rx:03Apr2017 Ordered   13  Lisinopril-Hydrochlorothiazide 20-12 5 MG Oral Tablet; take 1 tablet by mouth twice daily; Therapy: 20KQN5372 to (Aziza Wynn)  Requested for: 07BEB2816; Last  Rx:30Mar2017 Ordered   14  Meclizine HCl - 25 MG Oral Tablet; 1 tablet every 8 hrs for 3 day the q 8 hrs prn  vertigo; Therapy: 10BQH3998 to (Last Rx:28Mar2017)  Requested for: 28Mar2017 Ordered   15  MetFORMIN HCl - 1000 MG Oral Tablet; take one tablet by mouth twice daily; Therapy: 61SLA9457 to (Bay Torres)  Requested for: 18JYX9265; Last  Rx:06Nov2017 Ordered   16  Montelukast Sodium 10 MG Oral Tablet; Take 1 tablet by mouth at bedtime; Therapy: 04FCL2702 to (Evaluate:79Wgt3921)  Requested for: 65MNI2461; Last  Rx:28Jun2017 Ordered   17   Multi Vitamin/Minerals Oral Tablet Recorded   18  Multiple Vitamins Oral Tablet; Take one daily; Therapy: 21LBI8282 to (Last Rx:13Jan2017) Ordered   19  Omeprazole 20 MG Oral Capsule Delayed Release; TAKE 1 CAPSULE TWICE DAILY; Therapy: 81OMF9576 to (Rasta White)  Requested for: 23KQS4496; Last  Rx:21Nov2017 Ordered   20  One Touch Ultra (Devices) MISC; Patient tests twice daily; Therapy: (Recorded:29Nov2016) to Recorded   21  OneTouch Delica Lancets 11O Miscellaneous; TEST TWICE A DAY  Requested for:  71XDT8361; Last Rx:29Nov2016 Ordered   22  OneTouch Ultra Blue In [employer]; tests twice daily  Requested for: 60TVM7195; Last  Rx:29Nov2016 Ordered   23  Vitamin D 1000 UNIT Oral Tablet Recorded  Medication List Reviewed: The medication list was reviewed and updated today  Allergies  1  Iodinated Contrast Media  2  IV Dye   3  Other    Vitals  Vital Signs    Recorded: 33HBX8788 93:69VP   Systolic 182   Diastolic 80   Height 5 ft 5 in   Weight 178 lb 2 oz   BMI Calculated 29 64   BSA Calculated 1 88       Physical Exam   Constitutional  General appearance: No acute distress, well appearing and well nourished  Pulmonary  Respiratory effort: No increased work of breathing or signs of respiratory distress  Cardiovascular  Palpation of heart: Normal PMI, no thrills  Examination of extremities for edema and/or varicosities: Normal    Abdomen  Abdomen: Non-tender, no masses  Genitourinary  Anus and perineum: Normal    Digital rectal exam of prostate: Normal size, no masses  -- soft, no nodule, about 40g  Anus, perineum, and rectum: Normal    Musculoskeletal  Gait and station: Normal    Skin  Skin and subcutaneous tissue: Normal without rashes or lesions  Future Appointments    Date/Time Provider Specialty Site   01/25/2018 10:50 AM USAMA Horvath   Orthopedic Surgery [facility]       Signatures   Electronically signed by : Christina Gates [facility]; Dec  1 2017  1:17PM EST                       (Author) Electronically signed by : USAMA Mckeon ; Dec  5 2017  8:22AM EST

## 2017-12-08 ENCOUNTER — APPOINTMENT (OUTPATIENT)
Dept: PHYSICAL THERAPY | Facility: OTHER | Age: 72
End: 2017-12-08
Payer: COMMERCIAL

## 2017-12-08 ENCOUNTER — GENERIC CONVERSION - ENCOUNTER (OUTPATIENT)
Dept: OBGYN CLINIC | Facility: OTHER | Age: 72
End: 2017-12-08

## 2017-12-08 DIAGNOSIS — M77.11 LATERAL EPICONDYLITIS OF RIGHT ELBOW: ICD-10-CM

## 2017-12-08 DIAGNOSIS — M75.41 IMPINGEMENT SYNDROME OF RIGHT SHOULDER: ICD-10-CM

## 2017-12-08 PROCEDURE — G8994 SUB PT/OT GOAL STATUS: HCPCS

## 2017-12-08 PROCEDURE — 97140 MANUAL THERAPY 1/> REGIONS: CPT

## 2017-12-08 PROCEDURE — 97161 PT EVAL LOW COMPLEX 20 MIN: CPT

## 2017-12-08 PROCEDURE — G8993 SUB PT/OT CURRENT STATUS: HCPCS

## 2017-12-10 NOTE — PROGRESS NOTES
Assessment    1  H  pylori infection (041 86) (A04 8)   2  Bright red blood per rectum (569 3) (K62 5)    Plan  H  pylori infection    · Omeprazole 20 MG Oral Capsule Delayed Release; TAKE 1 CAPSULE TWICEDAILY   Rx By: Wili Hodge; Dispense: 14 Days ; #:28 Capsule Delayed Release; Refill: 0;H  pylori infection; GETACHEW = N; Verified Transmission to [facility] #[id number]; Last Updated By: System, [employer]; 12/6/2017 6:47:31 PM   · (1) HELICOBACTER PYLORI ANTIGEN, STOOL; Status:Active; Requestedfor:14Dvm1729;    Perform:[facility]; DII:[id number]; Ordered;pylori infection; Ordered By:Boo Carranza;   · Follow-up PRN Evaluation and Treatment  Follow-up  Status: Complete  Done:44Khp1317   Ordered;H  pylori infection; Ordered By: Wili Hodge Performed:  Due: 58QNB1943    Discussion/Summary  Discussion Summary:   1  H pylori induced gastritis - complete 14 day course of triple therapy i e clarithromycin, amoxicillin and omeprazole  Once you complete 14 day course of antibiotics, continue omeprazole for additional 2 weeks and stop omeprazole  weeks after you stop omeprazole, give stool sample for H pylori stool antigen to confirm eradication  Hematochezia - self limited  severe diverticulosis and hemorrhoid noted  He was evaluated by colorectal surgeon and told no surgical intervention is necessary for the hemorrhoids  I advised him to eat high-fiber diet and avoid constipation  If you have recurrent bleeding follow up with a colorectal surgeon  Counseling Documentation With Imm: The patient was counseled regarding prognosis,-- patient and family education,-- impressions  Goals and Barriers: The patient has the current Goals: See above  The patent has the current Barriers: None  Chief Complaint  Chief Complaint Free Text Note Form: hospital follow after having a gi bleed      History of Present Illness  HPI: 70-year-old male here for follow-up visit  He was admitted was hematochezia   EGD showed esophagitis, gastritis and duodenitis  Biopsy was positive for H pylori infection  He was treated with clarithromycin based triple therapy  Colonoscopy showed moderately severe diverticulosis in the ascending sigmoid and descending colon  Internal hemorrhoids noted  Review of Systems  Complete-Male GI Adult:  Constitutional: No fever or chills, feels well, no tiredness, no recent weight gain or weight loss  Eyes: No complaints of eye pain, no red eyes, no discharge from eyes, no itchy eyes  ENT: no complaints of earache, no hearing loss, no nosebleeds, no nasal discharge, no sore throat, no hoarseness  Cardiovascular: No complaints of slow heart rate, no fast heart rate, no chest pain, no palpitations, no leg claudication, no lower extremity  Respiratory: No complaints of shortness of breath, no wheezing, no cough, no SOB on exertion, no orthopnea or PND  Gastrointestinal: as noted in HPI  Genitourinary: No complaints of dysuria, no incontinence, no hesitancy, no nocturia, no genital lesion, no testicular pain  Musculoskeletal: No complaints of arthralgia, no myalgias, no joint swelling or stiffness, no limb pain or swelling  Integumentary: No complaints of skin rash or skin lesions, no itching, no skin wound, no dry skin  Neurological: No compliants of headache, no confusion, no convulsions, no numbness or tingling, no dizziness or fainting, no limb weakness, no difficulty walking  Psychiatric: Is not suicidal, no sleep disturbances, no anxiety or depression, no change in personality, no emotional problems  Endocrine: No complaints of proptosis, no hot flashes, no muscle weakness, no erectile dysfunction, no deepening of the voice, no feelings of weakness  Hematologic/Lymphatic: No complaints of swollen glands, no swollen glands in the neck, does not bleed easily, no easy bruising  ROS Reviewed:   ROS reviewed  Active Problems  1  Acute blood loss anemia (285 1) (D62)   2   Allergic rhinitis (477 9) (J30 9)   3  Atypical nevi (216 9) (D22 9)   4  Bright red blood per rectum (569 3) (K62 5)   5  Calculus of kidney (592 0) (N20 0)   6  Chronic pain of both shoulders (719 41,338 29) (M25 511,M25 512,G89 29)   7  Chronic rhinitis (472 0) (J31 0)   8  DMII (diabetes mellitus, type 2) (250 00) (E11 9)   9  Enlarged prostate without lower urinary tract symptoms (luts) (600 00) (N40 0)   10  Generalized osteoarthritis (715 00) (M15 9)   11  H  pylori infection (041 86) (A04 8)   12  Hypertension (401 9) (I10)   13  Impingement syndrome of right shoulder (726 2) (M75 41)   14  Lateral epicondylitis of right elbow (726 32) (M77 11)   15  Left facial numbness (782 0) (R20 0)   16  Left facial numbness (782 0) (R20 0)   17  Low back pain (724 2) (M54 5)   18  Male erectile dysfunction (607 84) (N52 9)   19  Need for vaccination (V05 9) (Z23)   20  Parotid swelling (782 3) (R60 9)   21  Rectal bleeding (569 3) (K62 5)   22  Sjogren's syndrome (710 2) (M35 00)    Past Medical History    1  History of Abrasion of left cornea (918 1) (S05 02XA)   2  Acute bronchitis (466 0) (J20 9)   3  History of Acute Lymphadenitis (683)   4  History of Acute serous otitis media, unspecified laterality   5  History of Acute upper respiratory infection (465 9) (J06 9)   6  History of Aftercare following surgery (V58 89) (Z48 89)   7  History of Benign colon polyp (211 3) (K63 5)   8  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)   9  History of Benign positional vertigo (386 11) (H81 10)   10  History of Bilateral leg edema (782 3) (R60 0)   11  Cough (786 2) (R05)   12  History of Diabetes Mellitus (250 00)   13  History of Diverticulitis of colon (562 11) (K57 32)   14  History of Dry skin dermatitis (692 89) (L85 3)   15  History of Enlarged parotid gland (527 1) (K11 1)   16  History of Fatty liver (571 8) (K76 0)   17  History of Hematospermia (608 82) (R36 1)   18   History of High risk medication use (V58 69) (Z79 899)   19  History of Bell's palsy (V12 49) (Z86 69)   20  History of chronic sinusitis (V12 69) (Z87 09)   21  History of dizziness (V13 89) (Z87 898)   22  History of hemorrhoids (V13 89) (Z87 19)   23  History of hyperlipidemia (V12 29) (Z86 39)   24  History of pneumonia (V12 61) (Z87 01)   25  History of pneumonia (V12 61) (Z87 01)   26  History of Hordeolum externum of left eye (373 11) (H00 016)   27  History of Medicare annual wellness visit, initial (V70 0) (Z00 00)   28  History of Need for influenza vaccination (V04 81) (Z23)   29  History of Need for vaccination (V05 9) (Z23)   30  History of Otitis media of right ear (382 9) (H66 91)   31  History of Pain in joint of left shoulder (719 41) (M25 512)   32  History of Pain of left heel (729 5) (M79 672)   33  History of Periorbital cellulitis of left eye (682 0) (L03 213)   34  History of Pharyngitis (462) (J02 9)   35  History of Rotator cuff tendinitis, left (726 10) (M75 82)   36  History of Scrotal disorder (608 9) (N50 9)   37  History of Trigger finger, left (727 03) (M65 30)   38  History of Upper respiratory infection (465 9) (J06 9)  Active Problems And Past Medical History Reviewed: The active problems and past medical history were reviewed and updated today  Surgical History  1  History of Complete Colonoscopy   2  History of Hernia Repair  Surgical History Reviewed: The surgical history was reviewed and updated today  Family History  Mother    1  No pertinent family history  Father    2  Family history of Diabetes mellitus   3  Family history of Hypertension   4  Family history of Kidney disease  Son    5  Family history of Diabetes mellitus  Family History Reviewed: The family history was reviewed and updated today  Social History     · Never a smoker   · No drug use   · Nonsmoker (V49 89) (Z78 9)   · Occasional alcohol use   · Occasional caffeine consumption  Social History Reviewed:  The social history was reviewed and updated today  The social history was reviewed and is unchanged  Current Meds   1  AmLODIPine Besylate 10 MG Oral Tablet; take 1 tablet by mouth every day; Therapy: 90DPB1068 to (Evaluate:50Huc7709)  Requested for: 96XBW4472; Last Rx:28Jun2017 Ordered   2  Artificial Tears 0 4 % SOLN; INSTILL 1-2 DROPS INTO THE AFFECTED EYE(S) EVERY 6 HOURS; Therapy: 81LKV3925 to (Last Rx:11Nov2015)  Requested for: 64SBD2991 Ordered   3  Ascensia Autodisc Test DISK; test bid; Therapy: 01Yhx9001 to (96 137279)  Requested for: 22SKS3461; Last ZL:55LLB4198 Ordered   4  Azelastine HCl - 0 15 % Nasal Solution; USE 2 SPRAYS TWICE DAILY AS DIRECTED; Therapy: 47Usl4914 to (Evaluate:11Aug2017)  Requested for: 73Tdt0104; Last Rx:12Jun2017 Ordered   5  Cialis 20 MG Oral Tablet; take as directed; Therapy: 95LJU6225 to (Jeison King)  Requested for: 24GDR7979; Last YO:22VFT3968 Ordered   6  Finasteride 5 MG Oral Tablet; TAKE 1 TABLET DAILY AS DIRECTED; Therapy: 57PPR5976 to (Maury Welsh)  Requested for: 56ZCC4518; Last Rx:07Nov2017 Ordered   7  Fish Oil 1000 MG Oral Capsule Recorded   8  Fluticasone Propionate 50 MCG/ACT Nasal Suspension; USE 2 SPRAYS IN EACH NOSTRIL EVERY DAY; Therapy: 17YXW9516 to (Chase Hollins)  Requested for: 31ZFP3178; Last Rx:29Mar2017 Ordered   9  Glimepiride 2 MG Oral Tablet; take 2 tablets by mouth twice daily; Therapy: 78Exs4206 to (Evaluate:06Apr2018)  Requested for: 77LQP0982; Last Rx:60Ukv8048 Ordered   10  Januvia 25 MG Oral Tablet; TAKE 1 TABLET DAILY; Therapy: 53ZLZ4512 to (Deanne Braun)  Requested for: 21Xcj1819; Last  Rx:03Apr2017 Ordered   11  Lisinopril-Hydrochlorothiazide 20-12 5 MG Oral Tablet; take 1 tablet by mouth twice daily; Therapy: 94XJO1820 to (Vernon Preciado)  Requested for: 86GWP7618; Last  Rx:30Mar2017 Ordered   12  Meclizine HCl - 25 MG Oral Tablet; 1 tablet every 8 hrs for 3 day the q 8 hrs prn  vertigo;   Therapy: 75ZCX6504 to (Last Rx:28Mar2017)  Requested for: 59QSR4815 Ordered   13  MetFORMIN HCl - 1000 MG Oral Tablet; take one tablet by mouth twice daily; Therapy: 18CUQ0489 to (Elijah Morrell)  Requested for: 00KCV5067; Last  Rx:06Nov2017 Ordered   14  Montelukast Sodium 10 MG Oral Tablet; Take 1 tablet by mouth at bedtime; Therapy: 17VLG7514 to (Evaluate:08Sjk4872)  Requested for: 13VCM3158; Last  Rx:28Jun2017 Ordered   15  Multi Vitamin/Minerals Oral Tablet Recorded   16  Multiple Vitamins Oral Tablet; Take one daily; Therapy: 06ZZE3034 to (Last Rx:13Jan2017) Ordered   17  Omeprazole 20 MG Oral Capsule Delayed Release; TAKE 1 CAPSULE TWICE DAILY; Therapy: 32TZY1256 to (Kaylen Harris)  Requested for: 78TKO6071; Last  Rx:21Nov2017 Ordered   18  One Touch Ultra (Devices) MISC; Patient tests twice daily; Therapy: (Recorded:29Nov2016) to Recorded   19  OneTouch Delica Lancets 60U Miscellaneous; TEST TWICE A DAY  Requested for:  92HME1954; Last Rx:29Nov2016 Ordered   20  OneTouch Ultra Blue In [employer]; tests twice daily  Requested for: 31WIS5479; Last  Rx:29Nov2016 Ordered   21  Vitamin D 1000 UNIT Oral Tablet Recorded  Medication List Reviewed: The medication list was reviewed and updated today  Allergies  1  Iodinated Contrast Media  2  IV Dye   3  Other    Vitals  Vital Signs    Recorded: 30FTT8050 01:42PM   Temperature 97 7 F, Tympanic   Heart Rate 76   Systolic 080, RUE, Sitting   Diastolic 70, RUE, Sitting   Height 5 ft 5 in   Weight 177 lb    BMI Calculated 29 45   BSA Calculated 1 88   O2 Saturation 98       Physical Exam   Constitutional  General appearance: No acute distress, well appearing and well nourished  Ears, Nose, Mouth, and Throat  Nasal mucosa, septum, and turbinates: Normal without edema or erythema  Oropharynx: Normal with no erythema, edema, exudate or lesions  Pulmonary  Respiratory effort: No increased work of breathing or signs of respiratory distress     Auscultation of lungs: Clear to auscultation, equal breath sounds bilaterally, no wheezes, no rales, no rhonci  Cardiovascular  Auscultation of heart: Normal rate and rhythm, normal S1 and S2, without murmurs  Abdomen  Abdomen: Non-tender, no masses  Liver and spleen: No hepatomegaly or splenomegaly  Lymphatic  Palpation of lymph nodes in neck: No lymphadenopathy  Musculoskeletal  Gait and station: Normal    Skin  Skin and subcutaneous tissue: Normal without rashes or lesions  Psychiatric  Orientation to person, place and time: Normal          Health Management  Rectal bleeding   COLONOSCOPY (GI, SURG); every 10 years; Last 76IZW3003; Next Due: 74ZTQ0808; Active    Future Appointments    Date/Time Provider Specialty Site   01/25/2018 10:50 AM USAMA Cook   Orthopedic Surgery [facility]       Signatures   Electronically signed by : Alexis Pinedo MD; Dec  9 2017  8:33PM EST                       (Author)

## 2017-12-13 ENCOUNTER — APPOINTMENT (OUTPATIENT)
Dept: PHYSICAL THERAPY | Facility: OTHER | Age: 72
End: 2017-12-13
Payer: COMMERCIAL

## 2017-12-13 PROCEDURE — 97110 THERAPEUTIC EXERCISES: CPT

## 2017-12-13 PROCEDURE — 97140 MANUAL THERAPY 1/> REGIONS: CPT

## 2017-12-15 ENCOUNTER — APPOINTMENT (OUTPATIENT)
Dept: PHYSICAL THERAPY | Facility: OTHER | Age: 72
End: 2017-12-15
Payer: COMMERCIAL

## 2017-12-15 PROCEDURE — 97110 THERAPEUTIC EXERCISES: CPT

## 2017-12-15 PROCEDURE — 97014 ELECTRIC STIMULATION THERAPY: CPT

## 2017-12-15 PROCEDURE — 97140 MANUAL THERAPY 1/> REGIONS: CPT

## 2017-12-15 PROCEDURE — 97112 NEUROMUSCULAR REEDUCATION: CPT

## 2017-12-20 ENCOUNTER — APPOINTMENT (OUTPATIENT)
Dept: PHYSICAL THERAPY | Facility: OTHER | Age: 72
End: 2017-12-20
Payer: COMMERCIAL

## 2017-12-20 PROCEDURE — 97014 ELECTRIC STIMULATION THERAPY: CPT

## 2017-12-20 PROCEDURE — 97140 MANUAL THERAPY 1/> REGIONS: CPT

## 2017-12-20 PROCEDURE — 97110 THERAPEUTIC EXERCISES: CPT

## 2017-12-22 ENCOUNTER — APPOINTMENT (OUTPATIENT)
Dept: PHYSICAL THERAPY | Facility: OTHER | Age: 72
End: 2017-12-22
Payer: COMMERCIAL

## 2017-12-22 PROCEDURE — 97140 MANUAL THERAPY 1/> REGIONS: CPT

## 2017-12-22 PROCEDURE — 97110 THERAPEUTIC EXERCISES: CPT

## 2017-12-22 PROCEDURE — 97112 NEUROMUSCULAR REEDUCATION: CPT

## 2017-12-22 PROCEDURE — 97014 ELECTRIC STIMULATION THERAPY: CPT

## 2017-12-27 ENCOUNTER — APPOINTMENT (OUTPATIENT)
Dept: PHYSICAL THERAPY | Facility: OTHER | Age: 72
End: 2017-12-27
Payer: COMMERCIAL

## 2017-12-27 PROCEDURE — 97110 THERAPEUTIC EXERCISES: CPT

## 2017-12-27 PROCEDURE — 97112 NEUROMUSCULAR REEDUCATION: CPT

## 2017-12-27 PROCEDURE — 97140 MANUAL THERAPY 1/> REGIONS: CPT

## 2017-12-29 ENCOUNTER — APPOINTMENT (OUTPATIENT)
Dept: PHYSICAL THERAPY | Facility: OTHER | Age: 72
End: 2017-12-29
Payer: COMMERCIAL

## 2017-12-29 PROCEDURE — 97014 ELECTRIC STIMULATION THERAPY: CPT

## 2017-12-29 PROCEDURE — 97110 THERAPEUTIC EXERCISES: CPT

## 2017-12-29 PROCEDURE — 97140 MANUAL THERAPY 1/> REGIONS: CPT

## 2017-12-29 PROCEDURE — 97112 NEUROMUSCULAR REEDUCATION: CPT

## 2018-01-03 ENCOUNTER — APPOINTMENT (OUTPATIENT)
Dept: PHYSICAL THERAPY | Facility: OTHER | Age: 73
End: 2018-01-03
Payer: COMMERCIAL

## 2018-01-03 PROCEDURE — G0283 ELEC STIM OTHER THAN WOUND: HCPCS

## 2018-01-03 PROCEDURE — 97112 NEUROMUSCULAR REEDUCATION: CPT

## 2018-01-03 PROCEDURE — 97110 THERAPEUTIC EXERCISES: CPT

## 2018-01-03 PROCEDURE — 97014 ELECTRIC STIMULATION THERAPY: CPT

## 2018-01-03 PROCEDURE — 97140 MANUAL THERAPY 1/> REGIONS: CPT

## 2018-01-05 ENCOUNTER — APPOINTMENT (OUTPATIENT)
Dept: PHYSICAL THERAPY | Facility: OTHER | Age: 73
End: 2018-01-05
Payer: COMMERCIAL

## 2018-01-05 PROCEDURE — 97014 ELECTRIC STIMULATION THERAPY: CPT

## 2018-01-05 PROCEDURE — 97112 NEUROMUSCULAR REEDUCATION: CPT

## 2018-01-05 PROCEDURE — 97140 MANUAL THERAPY 1/> REGIONS: CPT

## 2018-01-05 PROCEDURE — 97110 THERAPEUTIC EXERCISES: CPT

## 2018-01-05 PROCEDURE — G0283 ELEC STIM OTHER THAN WOUND: HCPCS

## 2018-01-09 NOTE — MISCELLANEOUS
Assessment    1  Bright red blood per rectum (569 3) (K62 5)   2  Acute blood loss anemia (285 1) (D62)   3  DMII (diabetes mellitus, type 2) (250 00) (E11 9)   4  Hypertension (401 9) (I10)   5  Chronic pain of both shoulders (719 41,338 29) (M25 511,M25 512,G89 29)   6  Right elbow pain (719 42) (M25 521)    Plan  Acute blood loss anemia    · (1) CBC/PLT/DIFF; Status:Active; Requested for:24Okz2271;    Perform:[facility]; NTB:43NQS0157; Ordered; For:Acute blood loss anemia; Ordered By:Rufina Howard;  Chronic pain of both shoulders, Right elbow pain    · *1 -  [facility] ([facility]TH[facility]IC SURGERY )  Co-Management  *  Status: Active  Requested for: 33MXM9525   Ordered; For: Chronic pain of both shoulders, Right elbow pain; Ordered By: Jorge L Salter Performed:  Due: 40DQM3937  Care Summary provided  : Yes    Discussion/Summary  Discussion Summary:   Patient hospital records were reviewed today with him Now that he is off meloxicam he is having issues with pain in the both shoulders again and his right elbow He wants to see orhto and consider shots Patient bowels are now normal He has follwoup with the GI doctor and also with colorectal for the internal hemorrhoids Patient will keep those appointments AS his blood count had dropped in the hospital I do want to repeat that in end of December Patient will cotninue current care  Chief Complaint  Chief Complaint Free Text Note Form: follow up hospital 11/09/2018 - 11/13/2017      History of Present Illness  TCM Communication [facility]: The patient is being contacted for follow-up after hospitalization and 11/16/2017  He was hospitalized at [facility]  The date of admission: 11/09/2017, date of discharge: 11/13/2017  Diagnosis: Black Bloody Stools  He was discharged to home  Counseling was provided to the patient     Communication performed and completed by Keri SAN: Patient started having black stools on 11/9/2017 He then noted some bright red blood pre rectum He was evalutaed tin the ER and consult with GI was done He had colonsocopy and EGD There were some internal hemorrhoids found He was directed to see colorectal for this Patient had an EGD done also He has appt with Gi and witht colorectal He continues iwht pain int he shoulder and the right elbow He feels it is much worse since he is off meloxicam Patient wants to see the orhtopedic again      Review of Systems  Complete-Male:   Constitutional: No fever or chills, feels well, no tiredness, no recent weight gain or weight loss  Eyes: no eye pain and no eyesight problems  ENT: no complaints of earache, no hearing loss, no nosebleeds, no nasal discharge, no sore throat, no hoarseness  Cardiovascular: No complaints of slow heart rate, no fast heart rate, no chest pain, no palpitations, no leg claudication, no lower extremity  Respiratory: No complaints of shortness of breath, no wheezing, no cough, no SOB on exertion, no orthopnea or PND  Gastrointestinal: No complaints of abdominal pain, no constipation, no nausea or vomiting, no diarrhea or bloody stools  Musculoskeletal: arthralgias, but as noted in HPI  Integumentary: no rashes  Neurological: No compliants of headache, no confusion, no convulsions, no numbness or tingling, no dizziness or fainting, no limb weakness, no difficulty walking  Active Problems    1  Allergic rhinitis (477 9) (J30 9)   2  Atypical nevi (216 9) (D22 9)   3  Chronic pain of both shoulders (719 41,338 29) (M25 511,M25 512,G89 29)   4  Chronic rhinitis (472 0) (J31 0)   5  DMII (diabetes mellitus, type 2) (250 00) (E11 9)   6  Enlarged prostate without lower urinary tract symptoms (luts) (600 00) (N40 0)   7  Generalized osteoarthritis (715 00) (M15 9)   8  Hypertension (401 9) (I10)   9  Left facial numbness (782 0) (R20 0)   10  Left facial numbness (782 0) (R20 0)   11  Low back pain (724 2) (M54 5)   12   Male erectile dysfunction (607 84) (N52 9)   13  Need for vaccination (V05 9) (Z23)   14  Parotid swelling (782 3) (R60 9)   15  Sjogren's syndrome (710 2) (M35 00)    Past Medical History    1  History of Abrasion of left cornea (918 1) (S05 02XA)   2  Acute bronchitis (466 0) (J20 9)   3  History of Acute Lymphadenitis (683)   4  History of Acute serous otitis media, unspecified laterality   5  History of Acute upper respiratory infection (465 9) (J06 9)   6  History of Aftercare following surgery (V58 89) (Z48 89)   7  History of Benign colon polyp (211 3) (K63 5)   8  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)   9  History of Benign positional vertigo (386 11) (H81 10)   10  History of Bilateral leg edema (782 3) (R60 0)   11  Cough (786 2) (R05)   12  History of Diabetes Mellitus (250 00)   13  History of Diverticulitis of colon (562 11) (K57 32)   14  History of Dry skin dermatitis (692 89) (L85 3)   15  History of Enlarged parotid gland (527 1) (K11 1)   16  History of Fatty liver (571 8) (K76 0)   17  History of Hematospermia (608 82) (R36 1)   18  History of High risk medication use (V58 69) (Z79 899)   19  History of Bell's palsy (V12 49) (Z86 69)   20  History of chronic sinusitis (V12 69) (Z87 09)   21  History of dizziness (V13 89) (Z87 898)   22  History of hemorrhoids (V13 89) (Z87 19)   23  History of hyperlipidemia (V12 29) (Z86 39)   24  History of pneumonia (V12 61) (Z87 01)   25  History of pneumonia (V12 61) (Z87 01)   26  History of renal calculi (V13 01) (Z87 442)   27  History of Hordeolum externum of left eye (373 11) (H00 016)   28  History of Medicare annual wellness visit, initial (V70 0) (Z00 00)   29  History of Need for influenza vaccination (V04 81) (Z23)   30  History of Need for vaccination (V05 9) (Z23)   31  History of Otitis media of right ear (382 9) (H66 91)   32  History of Pain in joint of left shoulder (719 41) (M24 512)   33   History of Pain of left heel (729 5) (M79 672)   34  History of Periorbital cellulitis of left eye (682 0) (L03 213)   35  History of Pharyngitis (462) (J02 9)   36  History of Rotator cuff tendinitis, left (726 10) (M75 82)   37  History of Scrotal disorder (608 9) (N50 9)   38  History of Trigger finger, left (727 03) (M65 30)   39  History of Upper respiratory infection (465 9) (J06 9)    Surgical History    1  History of Complete Colonoscopy   2  History of Hernia Repair  Surgical History Reviewed: The surgical history was reviewed and updated today  Family History  Mother    1  No pertinent family history  Father    2  Family history of Diabetes mellitus   3  Family history of Hypertension   4  Family history of Kidney disease  Son    5  Family history of Diabetes mellitus    Social History    · No drug use   · Nonsmoker (V49 89) (Z78 9)   · Occasional alcohol use   · Occasional caffeine consumption  Social History Reviewed: The social history was reviewed and is unchanged  Current Meds   1  AmLODIPine Besylate 10 MG Oral Tablet; take 1 tablet by mouth every day; Therapy: 41HHB6593 to (Evaluate:56Shc5522)  Requested for: 46JJA7437; Last   Rx:28Jun2017 Ordered   2  Artificial Tears 0 4 % SOLN; INSTILL 1-2 DROPS INTO THE AFFECTED EYE(S) EVERY 6   HOURS; Therapy: 93AYM3957 to (Last Rx:11Nov2015)  Requested for: 90HYE7726 Ordered   3  Asc[employer]ia Autodisc Test DISK; test bid; Therapy: 48Wct9544 to [phone])  Requested for: 98UPA7789; Last   HA:04JPB9008 Ordered   4  Azelastine HCl - 0 15 % Nasal Solution; USE 2 SPRAYS TWICE DAILY AS DIRECTED; Therapy: 63Ztp9563 to (Evaluate:11Aug2017)  Requested for: 12Jun2017; Last   Rx:12Jun2017 Ordered   5  Cialis 20 MG Oral Tablet; take as directed; Therapy: 62NGO7382 to (Holli Severance)  Requested for: 51WRU8903; Last   WT:57QJA7623 Ordered   6  Finasteride 5 MG Oral Tablet; TAKE 1 TABLET DAILY AS DIRECTED;    Therapy: 03CXP9699 to (Osvaldo Pradhan)  Requested for: 81EGD3465; Last   Rx:07Nov2017 Ordered   7  Fish Oil 1000 MG Oral Capsule Recorded   8  Fluticasone Propionate 50 MCG/ACT Nasal Suspension; USE 2 SPRAYS IN EACH   NOSTRIL EVERY DAY; Therapy: 17AIW3180 to (Griselda Beady)  Requested for: 13MRD1922; Last   Rx:29Mar2017 Ordered   9  Glimepiride 2 MG Oral Tablet; take 2 tablets by mouth twice daily; Therapy: 67Ouz8675 to (Evaluate:06Apr2018)  Requested for: 10IYU4249; Last   Rx:08Oct2017 Ordered   10  Januvia 25 MG Oral Tablet; TAKE 1 TABLET DAILY; Therapy: 60TGC5123 to (Tanvi Walker)  Requested for: 03Apr2017; Last    Rx:03Apr2017 Ordered   11  Lisinopril-Hydrochlorothiazide 20-12 5 MG Oral Tablet; take 1 tablet by mouth twice daily; Therapy: 92CNS8532 to (Dwaine Bravo)  Requested for: 97JHA9823; Last    Rx:30Mar2017 Ordered   12  Meclizine HCl - 25 MG Oral Tablet; 1 tablet every 8 hrs for 3 day the q 8 hrs prn    vertigo; Therapy: 22VWT0321 to (Last Rx:28Mar2017)  Requested for: 28Mar2017 Ordered   13  MetFORMIN HCl - 1000 MG Oral Tablet; take one tablet by mouth twice daily; Therapy: 21PJE1748 to (Houston Thompson)  Requested for: 16XXU3335; Last    Rx:06Nov2017 Ordered   14  Montelukast Sodium 10 MG Oral Tablet; Take 1 tablet by mouth at bedtime; Therapy: 81SOS9340 to (Evaluate:57Egc9266)  Requested for: 91NUE5970; Last    Rx:28Jun2017 Ordered   15  Multi Vitamin/Minerals Oral Tablet Recorded   16  Multiple Vitamins Oral Tablet; Take one daily; Therapy: 12SQJ2909 to (Last Rx:13Jan2017) Ordered   17  One Touch Ultra (Devices) MISC; Patient tests twice daily; Therapy: (Recorded:29Nov2016) to Recorded   18  OneTouch Delica Lancets 47G Miscellaneous; TEST TWICE A DAY  Requested for:    10ALD6503; Last Rx:29Nov2016 Ordered   19  OneTouch Ultra Blue In [employer]; tests twice daily  Requested for: 46XRS1863; Last    Rx:29Nov2016 Ordered   20  Vitamin D 1000 UNIT Oral Tablet Recorded  Medication List Reviewed:    The medication list was reviewed and updated today  Allergies    1  Iodinated Contrast Media    2  IV Dye   3  Other    Vitals  Signs   Recorded: 07RLP0050 56:56AM   Systolic: 616  Diastolic: 80  Height: 5 ft 5 in  Weight: 172 lb   BMI Calculated: 28 62  BSA Calculated: 1 86    Physical Exam    Constitutional   General appearance: No acute distress, well appearing and well nourished  Eyes   Conjunctiva and lids: No swelling, erythema, or discharge  Pupils and irises: Equal, round and reactive to light  Ears, Nose, Mouth, and Throat   External inspection of ears and nose: Normal     Otoscopic examination: Tympanic membrance translucent with normal light reflex  Canals patent without erythema  Nasal mucosa, septum, and turbinates: Normal without edema or erythema  Oropharynx: Normal with no erythema, edema, exudate or lesions  Pulmonary   Auscultation of lungs: Clear to auscultation, equal breath sounds bilaterally, no wheezes, no rales, no rhonci  Cardiovascular   Auscultation of heart: Normal rate and rhythm, normal S1 and S2, without murmurs  Examination of extremities for edema and/or varicosities: Normal     Carotid pulses: Normal     Abdomen   Abdomen: Non-tender, no masses  Liver and spleen: No hepatomegaly or splenomegaly  Lymphatic   Palpation of lymph nodes in neck: No lymphadenopathy  Musculoskeletal   Gait and station: Normal     Skin   Skin and subcutaneous tissue: Normal without rashes or lesions  Neurologic   Cranial nerves: Cranial nerves 2-12 intact      Psychiatric   Orientation to person, place and time: Normal     Mood and affect: Normal          Future Appointments    Date/Time Provider Specialty Site   12/01/2017 01:15 PM Tammi Gallagher, [facility] [address]     Signatures   Electronically signed by : Damian Denise DO; Nov 16 2017  2:02PM EST                       (Author)

## 2018-01-10 ENCOUNTER — APPOINTMENT (OUTPATIENT)
Dept: PHYSICAL THERAPY | Facility: OTHER | Age: 73
End: 2018-01-10
Payer: COMMERCIAL

## 2018-01-10 ENCOUNTER — TRANSCRIBE ORDERS (OUTPATIENT)
Dept: LAB | Facility: HOSPITAL | Age: 73
End: 2018-01-10

## 2018-01-10 ENCOUNTER — APPOINTMENT (OUTPATIENT)
Dept: LAB | Facility: HOSPITAL | Age: 73
End: 2018-01-10
Payer: COMMERCIAL

## 2018-01-10 DIAGNOSIS — D62 ACUTE POSTHEMORRHAGIC ANEMIA: ICD-10-CM

## 2018-01-10 LAB
BASOPHILS # BLD AUTO: 0.15 THOUSANDS/ΜL (ref 0–0.1)
BASOPHILS NFR BLD AUTO: 2 % (ref 0–1)
EOSINOPHIL # BLD AUTO: 0.71 THOUSAND/ΜL (ref 0–0.61)
EOSINOPHIL NFR BLD AUTO: 9 % (ref 0–6)
ERYTHROCYTE [DISTWIDTH] IN BLOOD BY AUTOMATED COUNT: 13.2 % (ref 11.6–15.1)
HCT VFR BLD AUTO: 41.8 % (ref 36.5–49.3)
HGB BLD-MCNC: 14 G/DL (ref 12–17)
LYMPHOCYTES # BLD AUTO: 3.16 THOUSANDS/ΜL (ref 0.6–4.47)
LYMPHOCYTES NFR BLD AUTO: 38 % (ref 14–44)
MCH RBC QN AUTO: 30.9 PG (ref 26.8–34.3)
MCHC RBC AUTO-ENTMCNC: 33.5 G/DL (ref 31.4–37.4)
MCV RBC AUTO: 92 FL (ref 82–98)
MONOCYTES # BLD AUTO: 0.59 THOUSAND/ΜL (ref 0.17–1.22)
MONOCYTES NFR BLD AUTO: 7 % (ref 4–12)
NEUTROPHILS # BLD AUTO: 3.68 THOUSANDS/ΜL (ref 1.85–7.62)
NEUTS SEG NFR BLD AUTO: 44 % (ref 43–75)
NRBC BLD AUTO-RTO: 0 /100 WBCS
PLATELET # BLD AUTO: 304 THOUSANDS/UL (ref 149–390)
PMV BLD AUTO: 10.7 FL (ref 8.9–12.7)
RBC # BLD AUTO: 4.53 MILLION/UL (ref 3.88–5.62)
WBC # BLD AUTO: 8.31 THOUSAND/UL (ref 4.31–10.16)

## 2018-01-10 PROCEDURE — 97110 THERAPEUTIC EXERCISES: CPT

## 2018-01-10 PROCEDURE — 85025 COMPLETE CBC W/AUTO DIFF WBC: CPT

## 2018-01-10 PROCEDURE — 97112 NEUROMUSCULAR REEDUCATION: CPT

## 2018-01-10 PROCEDURE — 36415 COLL VENOUS BLD VENIPUNCTURE: CPT

## 2018-01-10 PROCEDURE — 97014 ELECTRIC STIMULATION THERAPY: CPT

## 2018-01-10 PROCEDURE — G0283 ELEC STIM OTHER THAN WOUND: HCPCS

## 2018-01-10 PROCEDURE — 97140 MANUAL THERAPY 1/> REGIONS: CPT

## 2018-01-11 NOTE — RESULT NOTES
Verified Results  (1) HEMOGLOBIN A1C 15AYA5574 12:13PM Laurie Keith    Order Number: [id number]  TW Order Number: [id number]     Test Name Result Flag Reference   HEMOGLOBIN A1C 7 6 % H 4 2-6 3   EST  AVG   GLUCOSE 171 mg/dl

## 2018-01-12 ENCOUNTER — APPOINTMENT (OUTPATIENT)
Dept: PHYSICAL THERAPY | Facility: OTHER | Age: 73
End: 2018-01-12
Payer: COMMERCIAL

## 2018-01-12 VITALS
WEIGHT: 173.38 LBS | SYSTOLIC BLOOD PRESSURE: 120 MMHG | HEIGHT: 65 IN | DIASTOLIC BLOOD PRESSURE: 72 MMHG | BODY MASS INDEX: 28.89 KG/M2 | TEMPERATURE: 98.4 F

## 2018-01-12 VITALS
DIASTOLIC BLOOD PRESSURE: 78 MMHG | WEIGHT: 175 LBS | BODY MASS INDEX: 29.16 KG/M2 | SYSTOLIC BLOOD PRESSURE: 120 MMHG | HEIGHT: 65 IN

## 2018-01-12 PROCEDURE — 97110 THERAPEUTIC EXERCISES: CPT

## 2018-01-12 PROCEDURE — 97014 ELECTRIC STIMULATION THERAPY: CPT

## 2018-01-12 PROCEDURE — G0283 ELEC STIM OTHER THAN WOUND: HCPCS

## 2018-01-12 PROCEDURE — 97112 NEUROMUSCULAR REEDUCATION: CPT

## 2018-01-12 PROCEDURE — 97530 THERAPEUTIC ACTIVITIES: CPT

## 2018-01-12 NOTE — RESULT NOTES
Verified Results  (1) COMPREHENSIVE METABOLIC PANEL 96YZN8719 56:31BJ [facility] Order Number: [id number]     Test Name Result Flag Reference   GLUCOSE,RANDM 168 mg/dL H    If the patient is fasting, the ADA then defines impaired fasting glucose as > 100 mg/dL and diabetes as > or equal to 123 mg/dL  SODIUM 136 mmol/L  136-145   POTASSIUM 4 1 mmol/L  3 5-5 3   CHLORIDE 97 mmol/L L 100-108   CARBON DIOXIDE 32 mmol/L  21-32   ANION GAP (CALC) 7 mmol/L  4-13   BLOOD UREA NITROGEN 14 mg/dL  5-25   CREATININE 0 96 mg/dL  0 60-1 30   Standardized to IDMS reference method   CALCIUM 8 5 mg/dL  8 3-10 1   BILI, TOTAL 1 06 mg/dL H 0 20-1 00   ALK PHOSPHATAS 79 U/L     ALT (SGPT) 22 U/L  12-78   AST(SGOT) 11 U/L  5-45   ALBUMIN 3 9 g/dL  3 5-5 0   TOTAL PROTEIN 7 1 g/dL  6 4-8 2   eGFR Non-African American      >60 0 ml/min/1 73sq m   - Patient Instructions: This is a fasting blood test  Water,black tea or black  coffee only after 9:00pm the night before test Drink 2 glasses of water the morning of test - Patient Instructions: This bloodwork is non-fasting  Please drink two glasses of   water morning of bloodwork  National Kidney Disease Education Program recommendations are as follows:  GFR calculation is accurate only with a steady state creatinine  Chronic Kidney disease less than 60 ml/min/1 73 sq  meters  Kidney failure less than 15 ml/min/1 73 sq  meters  (1) HEMOGLOBIN A1C 96NRJ6362 08:05AM [facility] Order Number: [id number]     Test Name Result Flag Reference   HEMOGLOBIN A1C 7 7 % H 4 2-6 3   EST  AVG  GLUCOSE 174 mg/dl       (1) LIPID PANEL, FASTING 34QFR6324 08:05AM [facility] Order Number: [id number]     Test Name Result Flag Reference   CHOLESTEROL 130 mg/dL     HDL,DIRECT 55 mg/dL  40-60   Specimen collection should occur prior to Metamizole administration due to the potential for falsely depressed results     LDL CHOLESTEROL CALCULATED 61 mg/dL  0-100   - Patient Instructions: This is a fasting blood test  Water,black tea or black  coffee only after 9:00pm the night before test   Drink 2 glasses of water the morning of test     - Patient Instructions: This is a fasting blood test  Water,black tea or black  coffee only after 9:00pm the night before test Drink 2 glasses of water the morning of test - Patient Instructions: This bloodwork is non-fasting  Please drink two glasses of   water morning of bloodwork  Triglyceride:         Normal              <150 mg/dl       Borderline High    150-199 mg/dl       High               200-499 mg/dl       Very High          >499 mg/dl  Cholesterol:         Desirable        <200 mg/dl      Borderline High  200-239 mg/dl      High             >239 mg/dl  HDL Cholesterol:        High    >59 mg/dL      Low     <41 mg/dL  LDL CALCULATED:    This screening LDL is a calculated result  It does not have the accuracy of the Direct Measured LDL in the monitoring of patients with hyperlipidemia and/or statin therapy  Direct Measure LDL (HSO871) must be ordered separately in these patients  TRIGLYCERIDES 72 mg/dL  <=150   Specimen collection should occur prior to N-Acetylcysteine or Metamizole administration due to the potential for falsely depressed results  (1) TSH 79WQU4147 08:05AM Dedra Bonds Order Number: [id number]     Test Name Result Flag Reference   TSH 2 770 uIU/mL  0 358-3 740   - Patient Instructions: This bloodwork is non-fasting  Please drink two glasses of water morning of bloodwork  - Patient Instructions: This is a fasting blood test  Water,black tea or black  coffee only after 9:00pm the night before test Drink 2 glasses of water the morning of test - Patient Instructions: This bloodwork is non-fasting  Please drink two glasses of   water morning of bloodwork    Patients undergoing fluorescein dye angiography may retain small amounts of fluorescein in the body for 48-72 hours post procedure  Samples containing fluorescein can produce falsely depressed TSH values  If the patient had this procedure,a specimen should be resubmitted post fluorescein clearance

## 2018-01-13 VITALS
WEIGHT: 174.5 LBS | TEMPERATURE: 97.7 F | BODY MASS INDEX: 29.07 KG/M2 | SYSTOLIC BLOOD PRESSURE: 116 MMHG | DIASTOLIC BLOOD PRESSURE: 56 MMHG | HEIGHT: 65 IN

## 2018-01-13 VITALS — BODY MASS INDEX: 29.29 KG/M2 | WEIGHT: 176 LBS | SYSTOLIC BLOOD PRESSURE: 124 MMHG | DIASTOLIC BLOOD PRESSURE: 72 MMHG

## 2018-01-13 VITALS
SYSTOLIC BLOOD PRESSURE: 124 MMHG | WEIGHT: 172 LBS | HEIGHT: 65 IN | BODY MASS INDEX: 28.66 KG/M2 | DIASTOLIC BLOOD PRESSURE: 80 MMHG

## 2018-01-14 VITALS
WEIGHT: 177.38 LBS | DIASTOLIC BLOOD PRESSURE: 70 MMHG | SYSTOLIC BLOOD PRESSURE: 124 MMHG | BODY MASS INDEX: 29.55 KG/M2 | HEIGHT: 65 IN

## 2018-01-14 VITALS
DIASTOLIC BLOOD PRESSURE: 70 MMHG | SYSTOLIC BLOOD PRESSURE: 130 MMHG | HEIGHT: 65 IN | WEIGHT: 177.5 LBS | BODY MASS INDEX: 29.57 KG/M2

## 2018-01-14 VITALS
BODY MASS INDEX: 29.52 KG/M2 | HEART RATE: 94 BPM | SYSTOLIC BLOOD PRESSURE: 146 MMHG | DIASTOLIC BLOOD PRESSURE: 82 MMHG | WEIGHT: 177.38 LBS

## 2018-01-14 NOTE — RESULT NOTES
Discussion/Summary   gastric biopsy showed h  pylori infection  precription sent to the pharmacy  please have the nurse to inform the patient and give directions of antibiotics use  Verified Results  (1) TISSUE EXAM [id number] 01:20PM Leroy Prayer     Test Name Result Flag Reference   LAB AP CASE REPORT (Report)     Surgical Pathology Report             Case: [id number]                   Authorizing Provider: Liliana Cintron MD       Collected:      11/10/2017 1320        Ordering Location:   [address] [place] [address]   Received:      11/10/2017 [address] [place] [address] Endoscopy                               Pathologist:      Clau Alexander MD                             Specimen:  Stomach, r/o h pylori   LAB AP FINAL DIAGNOSIS (Report)     A  Stomach, biopsy:  - Helicobacter pylori gastritis with prominent chronic inflammation and   intestinal metaplasia (see note)  - Immunohistochemical stain performed with appropriate controls for   Helicobacter pylori is positive, supporting the diagnosis  - Negative for dysplasia or carcinoma  Electronically signed by Clau Alexander MD on 11/15/2017 at 9:28 AM   LAB AP NOTE (Report)     The sample shows markedly inflamed and reactive gastric mucosa with focal   intestinal metaplasia  The inflammatory cells focally expand the lamina   propria with germinal center formation and surrounding lymphoplasmacytic   response  Immunohistochemical stains performed with appropriate controls   show the lymphocytes to be predominantly CD20 positive B cells which are   negative for CD43 forming expansile nodules with germinal centers and a   background of CD3 positive T cells coexpressing CD43   Overall, the   inflammatory response appears reactive, though the dense prominent B-cell   population is mildly atypical  Correlation with clinical impression and   consideration for repeat biopsy following eradication of Helicobacter   pylori infection is recommended to exclude the possibility of mucosal   lymphoma  LAB AP SURGICAL ADDITIONAL INFORMATION (Report)     All controls performed with the immunohistochemical stains reported above   reacted appropriately  These tests were developed and their performance   characteristics determined by [facility] or   [employer]  They may not be cleared or approved by the [place]  Food and Drug Administration  The FDA has determined that such clearance   or approval is not necessary  These tests are used for clinical purposes  They should not be regarded as investigational or for research  This   laboratory has been approved by [facility] 88, designated as a high-complexity   laboratory and is qualified to perform these tests  Interpretation performed at [facility], [address] [place] [address], [facility]   LAB AP GROSS DESCRIPTION (Report)     A  The specimen is received in formalin, labeled with the patient's name   and hospital number, and is designated Stomach rule out H  Pylori  The   specimen consists of 2 tan soft tissue fragments measuring 0 1 and 0 2 cm  Entirely submitted  One cassette  Note: The estimated total formalin fixation time based upon information   provided by the submitting clinician and the standard processing schedule   is less than 72 hours      MCrites       Plan  H  pylori infection    · Amoxicillin 500 MG Oral Capsule; TAKE 2 CAPSULES TWICE DAILY UNTIL GONE   · Clarithromycin 500 MG Oral Tablet; TAKE 1 TABLET EVERY 12 HOURS DAILY   · Omeprazole 20 MG Oral Capsule Delayed Release; TAKE 1 CAPSULE TWICE  DAILY

## 2018-01-15 VITALS
TEMPERATURE: 98.8 F | HEIGHT: 65 IN | SYSTOLIC BLOOD PRESSURE: 120 MMHG | DIASTOLIC BLOOD PRESSURE: 68 MMHG | WEIGHT: 175.25 LBS | BODY MASS INDEX: 29.2 KG/M2

## 2018-01-15 NOTE — RESULT NOTES
Verified Results  (1) TISSUE EXAM [id number] 12:35PM Mario Norton     Test Name Result Flag Reference   LAB AP CASE REPORT (Report)     Surgical Pathology Report             Case: [id number]                   Authorizing Provider: Nestor Cornelius MD      Collected:      11/13/2017 1235        Ordering Location:   [address] [place] [address]   Received:      11/13/2017 [address] [place] [address] Endoscopy                               Pathologist:      Susie Paulino MD                              Specimen:  Polyp, Colorectal, cecum   LAB AP FINAL DIAGNOSIS      A  Colon, cecum, polyp:    - Colonic mucosa without significant pathologic abnormalities   consistent with redundant mucosal fold  - No dysplasia or neoplasia identified  Electronically signed by Susie Paulino MD on 11/16/2017 at 10:29 AM   LAB AP SURGICAL ADDITIONAL INFORMATION (Report)     All controls performed with the immunohistochemical stains reported above   reacted appropriately  These tests were developed and their performance   characteristics determined by [facility] or   [facility]  They may not be cleared or approved by the [place]  Food and Drug Administration  The FDA has determined that such clearance   or approval is not necessary  These tests are used for clinical purposes  They should not be regarded as investigational or for research  This   laboratory has been approved by CLIA 88, designated as a high-complexity   laboratory and is qualified to perform these tests  - Interpretation performed at [facility], [place] [facility]   LAB AP GROSS DESCRIPTION (Report)     A  The specimen is received in formalin, labeled with the patient's name   and hospital number, and is designated cecum polyp  The specimen   consists of one tan-pink soft tissue fragment measuring 0 5 cm in greatest   dimension  Entirely submitted  One cassette      Note: The estimated total formalin fixation time based upon information   provided by the submitting clinician and the standard processing schedule   is under 72 hours    Brody Flores

## 2018-01-16 NOTE — RESULT NOTES
Verified Results  CT SOFT TISSUE NECK WO CONTRAST [id number] 05:23PM Leanna Cardenas     Test Name Result Flag Reference   CT SOFT TISSUE NECK WO CONTRAST (Report)     CT SOFT TISSUE NECK WITHOUT CONTRAST     INDICATION: Edema/swelling, left-sided face, malnutrition     COMPARISON: CT of the brain 9/22/2017     TECHNIQUE: Contiguous 2 5 mm images were obtained through the neck  In addition, sagittal and coronal reformatted images were submitted for interpretation  Radiation dose length product (DLP) for this visit: 569 14 mGy-cm   This examination, like all CT scans performed in the [facility], was performed utilizing techniques to minimize radiation dose exposure, including the use of iterative   reconstruction and automated exposure control  IMAGE QUALITY: Diagnostic  FINDINGS:     VISUALIZED BRAIN PARENCHYMA: Normal visualized brain parenchyma  VISUALIZED ORBITS AND PARANASAL SINUSES: Right maxillary retention cyst or polyp     NASAL CAVITY AND NASOPHARYNX: Normal      SUPRAHYOID NECK: Normal oropharynx and oral cavity  Normal parapharyngeal and retropharyngeal spaces  Normal tonsillar tissue and epiglottis  Normal infratemporal space  , sublingual and submandibular spaces are normal      INFRAHYOID NECK: Aryepiglottic folds and piriform sinuses  Normal larynx and subglottic airway  THYROID GLAND:    Normal      PAROTID AND SUBMANDIBULAR GLANDS: Normal      LYMPH NODES: No pathologic or enlarged adenopathy  VASCULAR STRUCTURES: Limited without contrast      THORACIC INLET: Lung apices and upper mediastinum are unremarkable  BONY STRUCTURES: Normal        IMPRESSION:     Normal unenhanced CT of the neck  Discrete marrow edema within the neck is not evident  No cellulitis or abscess  No pathologic adenopathy  Workstation performed: [id number]     Signed by:    Refugio Stewart MD   10/3/17

## 2018-01-17 ENCOUNTER — APPOINTMENT (OUTPATIENT)
Dept: PHYSICAL THERAPY | Facility: OTHER | Age: 73
End: 2018-01-17
Payer: COMMERCIAL

## 2018-01-17 PROCEDURE — G8993 SUB PT/OT CURRENT STATUS: HCPCS

## 2018-01-17 PROCEDURE — 97110 THERAPEUTIC EXERCISES: CPT

## 2018-01-17 PROCEDURE — G8994 SUB PT/OT GOAL STATUS: HCPCS

## 2018-01-17 PROCEDURE — 97140 MANUAL THERAPY 1/> REGIONS: CPT

## 2018-01-17 PROCEDURE — 97112 NEUROMUSCULAR REEDUCATION: CPT

## 2018-01-19 ENCOUNTER — APPOINTMENT (OUTPATIENT)
Dept: PHYSICAL THERAPY | Facility: OTHER | Age: 73
End: 2018-01-19
Payer: COMMERCIAL

## 2018-01-19 PROCEDURE — 97110 THERAPEUTIC EXERCISES: CPT

## 2018-01-19 PROCEDURE — 97112 NEUROMUSCULAR REEDUCATION: CPT

## 2018-01-19 PROCEDURE — 97140 MANUAL THERAPY 1/> REGIONS: CPT

## 2018-01-22 ENCOUNTER — TRANSCRIBE ORDERS (OUTPATIENT)
Dept: LAB | Facility: HOSPITAL | Age: 73
End: 2018-01-22

## 2018-01-22 ENCOUNTER — APPOINTMENT (OUTPATIENT)
Dept: LAB | Facility: HOSPITAL | Age: 73
End: 2018-01-22
Attending: INTERNAL MEDICINE
Payer: COMMERCIAL

## 2018-01-22 VITALS
BODY MASS INDEX: 29.49 KG/M2 | OXYGEN SATURATION: 98 % | SYSTOLIC BLOOD PRESSURE: 120 MMHG | HEART RATE: 76 BPM | TEMPERATURE: 97.7 F | DIASTOLIC BLOOD PRESSURE: 70 MMHG | WEIGHT: 177 LBS | HEIGHT: 65 IN

## 2018-01-22 VITALS
HEIGHT: 65 IN | SYSTOLIC BLOOD PRESSURE: 130 MMHG | DIASTOLIC BLOOD PRESSURE: 78 MMHG | BODY MASS INDEX: 29.66 KG/M2 | WEIGHT: 178 LBS

## 2018-01-22 VITALS
HEIGHT: 65 IN | DIASTOLIC BLOOD PRESSURE: 72 MMHG | WEIGHT: 176 LBS | BODY MASS INDEX: 29.32 KG/M2 | SYSTOLIC BLOOD PRESSURE: 120 MMHG | TEMPERATURE: 98.4 F

## 2018-01-22 DIAGNOSIS — A04.8 OTHER SPECIFIED BACTERIAL INTESTINAL INFECTIONS: ICD-10-CM

## 2018-01-22 PROCEDURE — 87338 HPYLORI STOOL AG IA: CPT

## 2018-01-23 VITALS
HEIGHT: 65 IN | BODY MASS INDEX: 29.68 KG/M2 | WEIGHT: 178.13 LBS | DIASTOLIC BLOOD PRESSURE: 80 MMHG | SYSTOLIC BLOOD PRESSURE: 124 MMHG

## 2018-01-23 LAB — H PYLORI AG STL QL IA: NEGATIVE

## 2018-01-24 ENCOUNTER — OFFICE VISIT (OUTPATIENT)
Dept: PHYSICAL THERAPY | Facility: OTHER | Age: 73
End: 2018-01-24
Payer: COMMERCIAL

## 2018-01-24 DIAGNOSIS — G89.29 ELBOW PAIN, CHRONIC, RIGHT: Primary | ICD-10-CM

## 2018-01-24 DIAGNOSIS — M25.521 ELBOW PAIN, CHRONIC, RIGHT: Primary | ICD-10-CM

## 2018-01-24 PROCEDURE — 97112 NEUROMUSCULAR REEDUCATION: CPT | Performed by: PHYSICAL THERAPIST

## 2018-01-24 PROCEDURE — 97530 THERAPEUTIC ACTIVITIES: CPT | Performed by: PHYSICAL THERAPIST

## 2018-01-24 PROCEDURE — 97140 MANUAL THERAPY 1/> REGIONS: CPT | Performed by: PHYSICAL THERAPIST

## 2018-01-24 PROCEDURE — 97110 THERAPEUTIC EXERCISES: CPT | Performed by: PHYSICAL THERAPIST

## 2018-01-24 NOTE — PROGRESS NOTES
Daily Note     Today's date: 2018  Patient name: Liliana Guy  : 1945  MRN: [id number]  Referring provider: Manav Zaragoza MD  Dx:   Encounter Diagnosis   Name Primary?  Elbow pain, chronic, right Yes                  Subjective: patient to F/U with MD tomorrow he is progressing well  Very little TTP PT will likley be D/C from care updated HEP today  Objective: See treatment diary below  Manual   1 24 18           IASTM   Vega taping     x                                                                      Exercise Diary              Pulleys 5           Wrist ext stretch 10x10           Ball into wall 1 ea cw/ccw           TBLPD Green 15           TB Rows Green 15           Scap punches 2# 30           S/l ER 2# 30           TB wrist x 4 Gtb 30           theraweb 30           Flex ecc ext Red 30                                                                                                                                           Modalities             IASTM             Vega taping              EPAT 2300 2 5 bars  NP today               DC from PT   Assessment: Tolerated treatment well  Patient demonstrated fatigue post treatment      Plan: Progress note during next visit

## 2018-01-25 ENCOUNTER — OFFICE VISIT (OUTPATIENT)
Dept: OBGYN CLINIC | Facility: OTHER | Age: 73
End: 2018-01-25
Payer: COMMERCIAL

## 2018-01-25 VITALS
HEIGHT: 64 IN | BODY MASS INDEX: 30.52 KG/M2 | SYSTOLIC BLOOD PRESSURE: 150 MMHG | DIASTOLIC BLOOD PRESSURE: 73 MMHG | HEART RATE: 101 BPM | WEIGHT: 178.8 LBS

## 2018-01-25 DIAGNOSIS — M75.41 IMPINGEMENT SYNDROME OF RIGHT SHOULDER: Primary | ICD-10-CM

## 2018-01-25 DIAGNOSIS — M77.11 LATERAL EPICONDYLITIS OF RIGHT ELBOW: ICD-10-CM

## 2018-01-25 PROCEDURE — 99213 OFFICE O/P EST LOW 20 MIN: CPT | Performed by: ORTHOPAEDIC SURGERY

## 2018-01-25 NOTE — PATIENT INSTRUCTIONS
Symptoms related to right shoulder and right elbow have resolved, patient very satisfied with results  Understands that he is to follow up on as needed basis

## 2018-01-25 NOTE — PROGRESS NOTES
Assessment/Plan:  Assessment/Plan   Diagnoses and all orders for this visit:    Impingement syndrome of right shoulder    Lateral epicondylitis of right elbow          Subjective:   Patient ID: Philip Mendoza is a 81 y o  male  81year old male previously seen for symptoms of right shoulder impingement and right lateral epicondylitis  He was seen 6 weeks prior in clinic and referred to physical therapy for both diagnoses  He has progressed well with PT and has moved to a home exercise program at this point  His symptoms have al resolved and he has no further complaints  The following portions of the patient's history were reviewed and updated as appropriate: allergies, current medications, past family history, past medical history, past social history, past surgical history and problem list     Review of Systems   Constitutional: Negative  HENT: Negative  Respiratory: Negative  Cardiovascular: Negative  Gastrointestinal: Negative  Genitourinary: Negative  Musculoskeletal:        As noted in HPI   Skin: Negative  Allergic/Immunologic: Negative  Neurological: Negative  Hematological: Negative  Psychiatric/Behavioral: Negative  Objective:  Right Shoulder Exam     Tenderness   The patient is experiencing no tenderness  Range of Motion   The patient has normal right shoulder ROM  Forward Flexion: normal   External Rotation: normal     Muscle Strength   Abduction: 5/5   External Rotation: 5/5     Tests   Cross Arm: negative  Hawkin's test: negative  Impingement: negative    Other   Erythema: absent  Scars: absent  Sensation: normal  Pulse: present            Physical Exam   Constitutional: He is oriented to person, place, and time  He appears well-developed  HENT:   Head: Normocephalic  Neck: Normal range of motion  Cardiovascular: Intact distal pulses  Pulmonary/Chest: Effort normal    Abdominal: Soft     Neurological: He is alert and oriented to person, place, and time  Skin: Skin is warm and dry  Psychiatric: He has a normal mood and affect

## 2018-01-26 ENCOUNTER — APPOINTMENT (OUTPATIENT)
Dept: PHYSICAL THERAPY | Facility: OTHER | Age: 73
End: 2018-01-26
Payer: COMMERCIAL

## 2018-01-29 ENCOUNTER — TELEPHONE (OUTPATIENT)
Dept: GASTROENTEROLOGY | Facility: MEDICAL CENTER | Age: 73
End: 2018-01-29

## 2018-01-29 NOTE — TELEPHONE ENCOUNTER
----- Message from Sandra Vallecillo MD sent at 1/23/2018  9:10 PM EST -----  H pylori stool antigen is negative

## 2018-01-31 ENCOUNTER — APPOINTMENT (OUTPATIENT)
Dept: PHYSICAL THERAPY | Facility: OTHER | Age: 73
End: 2018-01-31
Payer: COMMERCIAL

## 2018-01-31 DIAGNOSIS — E11.9 TYPE 2 DIABETES MELLITUS WITHOUT COMPLICATION, WITHOUT LONG-TERM CURRENT USE OF INSULIN (HCC): Primary | ICD-10-CM

## 2018-01-31 RX ORDER — SITAGLIPTIN 25 MG/1
TABLET, FILM COATED ORAL
Qty: 30 TABLET | Refills: 5 | Status: SHIPPED | OUTPATIENT
Start: 2018-01-31 | End: 2018-07-28 | Stop reason: SDUPTHER

## 2018-02-11 DIAGNOSIS — E11.9 TYPE 2 DIABETES MELLITUS WITHOUT COMPLICATIONS (HCC): ICD-10-CM

## 2018-02-12 ENCOUNTER — OFFICE VISIT (OUTPATIENT)
Dept: GASTROENTEROLOGY | Facility: MEDICAL CENTER | Age: 73
End: 2018-02-12
Payer: COMMERCIAL

## 2018-02-12 VITALS
WEIGHT: 182 LBS | HEIGHT: 64 IN | BODY MASS INDEX: 31.07 KG/M2 | TEMPERATURE: 98.2 F | HEART RATE: 84 BPM | DIASTOLIC BLOOD PRESSURE: 84 MMHG | SYSTOLIC BLOOD PRESSURE: 136 MMHG

## 2018-02-12 DIAGNOSIS — K92.1 HEMATOCHEZIA: ICD-10-CM

## 2018-02-12 DIAGNOSIS — A04.8 H. PYLORI INFECTION: Primary | ICD-10-CM

## 2018-02-12 PROCEDURE — 99213 OFFICE O/P EST LOW 20 MIN: CPT | Performed by: PHYSICIAN ASSISTANT

## 2018-02-12 NOTE — LETTER
February 12, 2018     Mira Bloch, DO  [address]    Patient: Sherolyn Cockayne   YOB: 1945   Date of Visit: 2/12/2018       Dear Dr Brito Alert:    Thank you for referring Bridget Peña to me for evaluation  Below are my notes for this consultation  If you have questions, please do not hesitate to call me  I look forward to following your patient along with you  Sincerely,        Day Pretty PA-C        CC: No Recipients  Santo Ho  2/12/2018  1:17 PM  Sign at close encounter  Assessment/Plan:    No problem-specific Assessment & Plan notes found for this encounter  Diagnoses and all orders for this visit:    H  pylori infection  He was found to have H pylori and was treated with triple therapy  He has completed this and has had a stool antigen test which was negative  No further treatment is necessary  Hematochezia  He also had an episode of hematochezia and had a colonoscopy which showed moderate diverticulosis as well as internal hemorrhoids  Either of these could cause the bleeding  He has increased his fiber since then is having regular bowel movements with no further bleeding  Will see him back on an as-needed basis  Subjective:      Patient ID: Sherolyn Cockayne is a 81 y o  male  HPI    This is a follow-up for H pylori as well as hematochezia  Patient was found to have H pylori back in December  He completed triple therapy  He recently had his stool antigen test which was negative  He denies any symptoms of heartburn or reflux or abdominal pain  He also had an episode of hematochezia back in November  He underwent a colonoscopy at that time which showed moderately severe diverticulosis throughout the colon and internal hemorrhoids  He was also seen by Colorectal at that time who felt he did not need surgery for his hemorrhoids    He states he has increased his fiber intake since then and is having daily bowel movements with no further bleeding  Review of Systems   All other systems reviewed and are negative  Objective:    Vitals:    02/12/18 1251   BP: 136/84   Pulse: 84   Temp: 98 2 °F (36 8 °C)        Physical Exam   Constitutional: He is oriented to person, place, and time  He appears well-developed and well-nourished  No distress  HENT:   Head: Normocephalic and atraumatic  Eyes: Conjunctivae and EOM are normal    Neck: Normal range of motion  Cardiovascular: Normal rate and regular rhythm  Pulmonary/Chest: Effort normal and breath sounds normal    Abdominal: Soft  Bowel sounds are normal    Musculoskeletal: Normal range of motion  Neurological: He is alert and oriented to person, place, and time  Skin: Skin is warm and dry  Psychiatric: He has a normal mood and affect   His behavior is normal

## 2018-02-12 NOTE — PROGRESS NOTES
Assessment/Plan:    No problem-specific Assessment & Plan notes found for this encounter  Diagnoses and all orders for this visit:    H  pylori infection  He was found to have H pylori and was treated with triple therapy  He has completed this and has had a stool antigen test which was negative  No further treatment is necessary  Hematochezia  He also had an episode of hematochezia and had a colonoscopy which showed moderate diverticulosis as well as internal hemorrhoids  Either of these could cause the bleeding  He has increased his fiber since then is having regular bowel movements with no further bleeding  Will see him back on an as-needed basis  Subjective:      Patient ID: Ilana Nava is a 81 y o  male  HPI    This is a follow-up for H pylori as well as hematochezia  Patient was found to have H pylori back in December  He completed triple therapy  He recently had his stool antigen test which was negative  He denies any symptoms of heartburn or reflux or abdominal pain  He also had an episode of hematochezia back in November  He underwent a colonoscopy at that time which showed moderately severe diverticulosis throughout the colon and internal hemorrhoids  He was also seen by Colorectal at that time who felt he did not need surgery for his hemorrhoids  He states he has increased his fiber intake since then and is having daily bowel movements with no further bleeding  Review of Systems   All other systems reviewed and are negative  Objective:    Vitals:    02/12/18 1251   BP: 136/84   Pulse: 84   Temp: 98 2 °F (36 8 °C)        Physical Exam   Constitutional: He is oriented to person, place, and time  He appears well-developed and well-nourished  No distress  HENT:   Head: Normocephalic and atraumatic  Eyes: Conjunctivae and EOM are normal    Neck: Normal range of motion  Cardiovascular: Normal rate and regular rhythm      Pulmonary/Chest: Effort normal and breath sounds normal    Abdominal: Soft  Bowel sounds are normal    Musculoskeletal: Normal range of motion  Neurological: He is alert and oriented to person, place, and time  Skin: Skin is warm and dry  Psychiatric: He has a normal mood and affect   His behavior is normal

## 2018-02-16 ENCOUNTER — APPOINTMENT (OUTPATIENT)
Dept: LAB | Facility: HOSPITAL | Age: 73
End: 2018-02-16
Payer: COMMERCIAL

## 2018-02-16 DIAGNOSIS — E11.9 TYPE 2 DIABETES MELLITUS WITHOUT COMPLICATIONS (HCC): ICD-10-CM

## 2018-02-16 LAB
ALBUMIN SERPL BCP-MCNC: 4.2 G/DL (ref 3.5–5)
ALP SERPL-CCNC: 96 U/L (ref 46–116)
ALT SERPL W P-5'-P-CCNC: 23 U/L (ref 12–78)
ANION GAP SERPL CALCULATED.3IONS-SCNC: 7 MMOL/L (ref 4–13)
AST SERPL W P-5'-P-CCNC: 12 U/L (ref 5–45)
BILIRUB SERPL-MCNC: 0.51 MG/DL (ref 0.2–1)
BUN SERPL-MCNC: 15 MG/DL (ref 5–25)
CALCIUM SERPL-MCNC: 7.8 MG/DL (ref 8.3–10.1)
CHLORIDE SERPL-SCNC: 103 MMOL/L (ref 100–108)
CHOLEST SERPL-MCNC: 110 MG/DL (ref 50–200)
CO2 SERPL-SCNC: 28 MMOL/L (ref 21–32)
CREAT SERPL-MCNC: 0.82 MG/DL (ref 0.6–1.3)
CREAT UR-MCNC: 141 MG/DL
EST. AVERAGE GLUCOSE BLD GHB EST-MCNC: 171 MG/DL
GFR SERPL CREATININE-BSD FRML MDRD: 90 ML/MIN/1.73SQ M
GLUCOSE P FAST SERPL-MCNC: 134 MG/DL (ref 65–99)
HBA1C MFR BLD: 7.6 % (ref 4.2–6.3)
HDLC SERPL-MCNC: 48 MG/DL (ref 40–60)
LDLC SERPL CALC-MCNC: 53 MG/DL (ref 0–100)
MICROALBUMIN UR-MCNC: 31.5 MG/L (ref 0–20)
MICROALBUMIN/CREAT 24H UR: 22 MG/G CREATININE (ref 0–30)
POTASSIUM SERPL-SCNC: 4.5 MMOL/L (ref 3.5–5.3)
PROT SERPL-MCNC: 7.3 G/DL (ref 6.4–8.2)
SODIUM SERPL-SCNC: 138 MMOL/L (ref 136–145)
TRIGL SERPL-MCNC: 45 MG/DL
TSH SERPL DL<=0.05 MIU/L-ACNC: 2.65 UIU/ML (ref 0.36–3.74)

## 2018-02-16 PROCEDURE — 36415 COLL VENOUS BLD VENIPUNCTURE: CPT

## 2018-02-16 PROCEDURE — 84443 ASSAY THYROID STIM HORMONE: CPT

## 2018-02-16 PROCEDURE — 80061 LIPID PANEL: CPT

## 2018-02-16 PROCEDURE — 82043 UR ALBUMIN QUANTITATIVE: CPT

## 2018-02-16 PROCEDURE — 82570 ASSAY OF URINE CREATININE: CPT

## 2018-02-16 PROCEDURE — 83036 HEMOGLOBIN GLYCOSYLATED A1C: CPT

## 2018-02-16 PROCEDURE — 80053 COMPREHEN METABOLIC PANEL: CPT

## 2018-02-16 PROCEDURE — 3061F NEG MICROALBUMINURIA REV: CPT | Performed by: FAMILY MEDICINE

## 2018-03-01 ENCOUNTER — OFFICE VISIT (OUTPATIENT)
Dept: FAMILY MEDICINE CLINIC | Facility: CLINIC | Age: 73
End: 2018-03-01
Payer: COMMERCIAL

## 2018-03-01 VITALS
WEIGHT: 179.4 LBS | HEIGHT: 64 IN | BODY MASS INDEX: 30.63 KG/M2 | DIASTOLIC BLOOD PRESSURE: 78 MMHG | SYSTOLIC BLOOD PRESSURE: 128 MMHG

## 2018-03-01 DIAGNOSIS — I10 ESSENTIAL HYPERTENSION: ICD-10-CM

## 2018-03-01 DIAGNOSIS — N40.0 ENLARGED PROSTATE WITHOUT LOWER URINARY TRACT SYMPTOMS (LUTS): ICD-10-CM

## 2018-03-01 DIAGNOSIS — E11.9 TYPE 2 DIABETES MELLITUS WITHOUT COMPLICATION, WITHOUT LONG-TERM CURRENT USE OF INSULIN (HCC): Primary | ICD-10-CM

## 2018-03-01 DIAGNOSIS — J31.0 CHRONIC RHINITIS, UNSPECIFIED TYPE: ICD-10-CM

## 2018-03-01 DIAGNOSIS — M35.01 SJOGREN'S SYNDROME WITH KERATOCONJUNCTIVITIS SICCA (HCC): ICD-10-CM

## 2018-03-01 PROBLEM — M77.11 LATERAL EPICONDYLITIS OF RIGHT ELBOW: Status: RESOLVED | Noted: 2018-01-25 | Resolved: 2018-03-01

## 2018-03-01 PROBLEM — A04.8 HELICOBACTER PYLORI (H. PYLORI) INFECTION: Status: ACTIVE | Noted: 2018-03-01

## 2018-03-01 PROBLEM — D62 ACUTE BLOOD LOSS ANEMIA: Status: RESOLVED | Noted: 2017-11-16 | Resolved: 2018-03-01

## 2018-03-01 PROBLEM — R20.0 LEFT FACIAL NUMBNESS: Status: ACTIVE | Noted: 2017-09-22

## 2018-03-01 PROBLEM — R20.0 LEFT FACIAL NUMBNESS: Status: RESOLVED | Noted: 2017-09-22 | Resolved: 2018-03-01

## 2018-03-01 PROBLEM — A04.8 HELICOBACTER PYLORI (H. PYLORI) INFECTION: Status: RESOLVED | Noted: 2018-03-01 | Resolved: 2018-03-01

## 2018-03-01 PROBLEM — D62 ACUTE BLOOD LOSS ANEMIA: Status: ACTIVE | Noted: 2017-11-16

## 2018-03-01 PROCEDURE — 99214 OFFICE O/P EST MOD 30 MIN: CPT | Performed by: FAMILY MEDICINE

## 2018-03-01 NOTE — ASSESSMENT & PLAN NOTE
Patient has no joint issues with this His dry eyes are well controlled He saw eye doctor 3/27/2017 and has appointment scheduled

## 2018-03-01 NOTE — PROGRESS NOTES
Assessment/Plan:    Acute blood loss anemia  Hemoglobin on 1/10/2018 was 14 This problem has resolved was due to ulcer    Helicobacter pylori (H  pylori) infection  Patient was treated by GI with triple therapy and he had a stool antigen test negative so no further testing needed    Sjogren's syndrome [facility])  Patient has no joint issues with this His dry eyes are well controlled He saw eye doctor 3/27/2017 and has appointment scheduled    Type 2 diabetes mellitus without complication, without long-term current use of insulin [facility])  Patient had very controlled A1c However now since his illness his A1c elevated to 7 6 Patient feels it was stress dueto his illness and the fact that he was eating a lot of bland food due to ulcer like rice and noodles He for last one week is getting fasting sugars 120-130 so he feels things are improving Patient will therefore continue same regimen and have repeat labs in May and see me then  Patient saw Dr Melody Almaraz for his last foot exam and will sign for those records He is due for eye exam in late March 2018    Chronic rhinitis  Patient rhinitis is stable on singulari and flonase He will continue    Essential hypertension  Patient blood pressure is well controlled Continue same meds    Enlarged prostate without lower urinary tract symptoms (luts)  Reviewed urology note from 12/2017 Patient is urinating well and will continue the proscar       Diagnoses and all orders for this visit:    Type 2 diabetes mellitus without complication, without long-term current use of insulin ([address] )  -     HEMOGLOBIN A1C W/ EAG ESTIMATION; Future    Essential hypertension    Sjogren's syndrome with keratoconjunctivitis sicca (HCC)    Chronic rhinitis, unspecified type    Enlarged prostate without lower urinary tract symptoms (luts)          Subjective:   Chief Complaint   Patient presents with    Hypertension    Diabetes          Patient ID: Allan Pinedo is a 81 y o  male      Patient is here for followup of his Dm, hypertension, BPH, Sjorgen's, H  Pylori infection with blood loss anemia and chronic rhinitis Patient had triple therapy for H Pylori and a stool test that was negative Patient was released by GI His anemia also resolved He had hematochezia thought by GI to be due to hemorrhoids or diverticulosis Patient last Hb was 14 Patient sugars had been elevated during his illness Last A1c in 2/2018 was 7 6 However patient was not eating well due to his stomach was eating a lot of carbs patient for last 2 weeks has had sugars fasting 120-130 he saw Dr Parvez Palacios for foot exam and sees eye doctor later in [facility] for the DM eye exam and sjorgen's (for whtich he is using artificial tears) Patient blood pressures are stlabe his last urology note 12/2017 was reviewed Patient is doing well on the proscar one time per night nocturia Patient finds flonase and the singulair helpful with the rhinitis He feels well today        The following portions of the patient's history were reviewed and updated as appropriate: allergies, current medications, past family history, past medical history, past social history, past surgical history and problem list     Review of Systems   Constitutional: Negative for fatigue, fever and unexpected weight change  HENT: Negative for congestion, sinus pain and trouble swallowing  Eyes: Negative for discharge and visual disturbance  Respiratory: Negative for cough, chest tightness, shortness of breath and wheezing  Cardiovascular: Negative for chest pain, palpitations and leg swelling  Gastrointestinal: Negative for abdominal pain, blood in stool, constipation, diarrhea, nausea and vomiting  Genitourinary: Negative for difficulty urinating, dysuria, frequency and hematuria  Musculoskeletal: Negative for arthralgias, gait problem and joint swelling  Skin: Negative for rash and wound  Allergic/Immunologic: Negative for environmental allergies and food allergies     Neurological: Negative for dizziness, syncope, weakness, numbness and headaches  Hematological: Negative for adenopathy  Does not bruise/bleed easily  Psychiatric/Behavioral: Negative for confusion, decreased concentration and sleep disturbance  The patient is not nervous/anxious  Objective:      /78   Ht 5' 4" (1 626 m)   Wt 81 4 kg (179 lb 6 4 oz)   BMI 30 79 kg/m²          Physical Exam   Constitutional: He is oriented to person, place, and time  He appears well-developed and well-nourished  HENT:   Head: Normocephalic and atraumatic  Right Ear: Hearing, tympanic membrane and external ear normal    Left Ear: Hearing, tympanic membrane and external ear normal    Eyes: Conjunctivae and EOM are normal  Pupils are equal, round, and reactive to light  Neck: Neck supple  No thyromegaly present  Cardiovascular: Normal rate and normal heart sounds  Pulmonary/Chest: Effort normal and breath sounds normal  He has no wheezes  He has no rales  Abdominal: Soft  Bowel sounds are normal  He exhibits no distension  There is no tenderness  Musculoskeletal: He exhibits no edema or tenderness  Lymphadenopathy:     He has no cervical adenopathy  Neurological: He is alert and oriented to person, place, and time  No cranial nerve deficit  Coordination normal    Skin: Skin is warm and dry  No rash noted  Psychiatric: He has a normal mood and affect   His behavior is normal  Judgment and thought content normal

## 2018-03-01 NOTE — ASSESSMENT & PLAN NOTE
Patient had very controlled A1c However now since his illness his A1c elevated to 7 6 Patient feels it was stress dueto his illness and the fact that he was eating a lot of bland food due to ulcer like rice and noodles He for last one week is getting fasting sugars 120-130 so he feels things are improving Patient will therefore continue same regimen and have repeat labs in May and see me then   Patient saw Dr Katie Laughlin for his last foot exam and will sign for those records He is due for eye exam in late March 2018

## 2018-03-01 NOTE — ASSESSMENT & PLAN NOTE
Patient was treated by GI with triple therapy and he had a stool antigen test negative so no further testing needed

## 2018-03-01 NOTE — PATIENT INSTRUCTIONS
Diabetes in the Older Adult   AMBULATORY CARE:   What you need to know if you are an older adult with diabetes:  Older adults with diabetes are at risk for heart disease, stroke, kidney disease, blindness, and nerve damage  You may also be at risk for any of the following:  · Poor nutrition or low blood sugar levels    · Confusion or problems with memory, attention, or learning new things    · Trouble controlling urination or frequent urinary tract infections    · Trouble with coordination or balance    · Falls and injuries    · Pain    · Depression    · Open sores on your legs or feet  The ABCs of diabetes: The ABCs stand for certain things you can do to manage or prevent problems caused by diabetes:  · A  stands for A1c test   This test shows the average amount of sugar in your blood over the past 2 to 3 months  High levels of sugar in your blood can cause damage to your heart, blood vessels, kidneys, feet, and eyes  Most older adults with diabetes should have an A1c level less than 7 5  Ask your healthcare provider if this A1c goal is right for you  Your provider can help you make changes if your A1c is too high  · B  stands for blood pressure   High blood pressure can increase your risk for a heart attack, stroke, or kidney disease  Most older adults with diabetes should have a systolic blood pressure (first number) of 140  Your diastolic blood pressure (second number) should be below 90  Ask your healthcare provider if these blood pressure goals are right for you  · C  stands for cholesterol   High levels of cholesterol can block your arteries and cause a heart attack or stroke  Ask your healthcare provider what your cholesterol levels should be  · S  stands for stop smoking   Nicotine and other chemicals in cigarettes and cigars can cause lung damage and make it more difficult to manage your diabetes    Call 911 if you have any of the following:   · You have any of the following signs of a stroke: ¨ Numbness or drooping on one side of your face     ¨ Weakness in an arm or leg    ¨ Confusion or difficulty speaking    ¨ Dizziness, a severe headache, or vision loss    · You have any of the following signs of a heart attack:      ¨ Squeezing, pressure, or pain in your chest that lasts longer than 5 minutes or returns    ¨ Discomfort or pain in your back, neck, jaw, stomach, or arm     ¨ Trouble breathing    ¨ Nausea or vomiting    ¨ Lightheadedness or a sudden cold sweat, especially with chest pain or trouble breathing  Seek care immediately if:   · You have severe abdominal pain, or the pain spreads to your back  You may also be vomiting  · You have trouble staying awake or focusing  · You are shaking or sweating  · You have blurred or double vision  · Your breath has a fruity, sweet smell  · Your breathing is deep and labored, or rapid and shallow  · Your heartbeat is fast and weak  · You fall and get hurt  Contact your healthcare provider if:   · You are vomiting or have diarrhea  · You have an upset stomach and cannot eat the foods on your meal plan  · You feel weak or more tired than usual      · You feel dizzy, have headaches, or are easily irritated  · Your skin is red, warm, dry, or swollen  · You have a wound that does not heal      · You have numbness in your arms or legs  · You have trouble coping with your illness, or you feel anxious or depressed  · You have problems with your memory  · You have changes in your vision  · You have questions or concerns about your condition or care  Treatment for diabetes  includes keeping your blood sugar at a normal level  You must eat the right foods, and exercise regularly  You may need medicine if you cannot control your blood sugar level with nutrition and exercise  You may also need medicine to lower your blood pressure or cholesterol, or medicine to prevent blood clots     Manage the ABCs and prevent problems caused by diabetes:   · Check your blood sugar levels as directed  Your healthcare provider will tell you when and how often to check during the day  Your healthcare provider will also tell you what your blood sugar levels should be before and after a meal  You may need to check for ketones in your urine or blood if your level is higher than directed  Write down your results and show them to your healthcare provider  Your provider may use the results to make changes to your medicine, food, or exercise schedules  Ask your healthcare provider for more information about how to treat a high or low blood sugar level  · Follow your meal plan as directed  A dietitian will help you make a meal plan to keep your blood sugar level steady and make sure you get enough nutrition  Do not skip meals  Your blood sugar level may drop too low if you have taken diabetes medicine and do not eat  Ask your healthcare provider about programs in your community that can deliver the meals to your home  · Try to be active for 30 to 60 minutes most days of the week  Exercise can help keep your blood sugar level steady, decrease your risk of heart disease, and help you lose weight  It can also help improve your balance and decrease your risk for falls  Work with your healthcare provider to create an exercise plan  Ask a family member or friend to exercise with you  Start slow and exercise for 5 to 10 minutes at a time  Examples of activities include walking or swimming  Include muscle strengthening activities 2 to 3 days each week  Include balance training 2 to 3 times each week  Activities that help increase balance include yoga and ana rosa chi      · Maintain a healthy weight  Ask your healthcare provider how much you should weigh  A healthy weight can help you control your diabetes and prevent heart disease  Ask your provider to help you create a weight loss plan if you are overweight   Together you can set manageable weight loss goals  · Do not smoke  Ask your healthcare provider for information if you currently smoke and need help to quit  Do not use e-cigarettes or smokeless tobacco in place of cigarettes or to help you quit  They still contain nicotine  · Manage stress  Stress may increase your blood sugar level  Deep breathing, muscle relaxation, and music may help you relax  Ask your healthcare provider for more information about these practices  Other ways to manage your diabetes:   · Check your feet every day for sores  Look at your whole foot, including the bottom, and between and under your toes  Check for wounds, corns, and calluses  Use a mirror to see the bottom of your feet  The skin on your feet may be shiny, tight, dry, or darker than normal  Your feet may also be cold and pale  Feel your feet by running your hands along the tops, bottoms, sides, and between your toes  Redness, swelling, and warmth are signs of blood flow problems that can lead to a foot ulcer  Do not try to remove corns or calluses yourself  · Wear medical alert identification  Wear medical alert jewelry or carry a card that says you have diabetes  Ask your healthcare provider where to get these items  · Ask about vaccines  You have a higher risk for serious illness if you get the flu, pneumonia, or hepatitis  Ask your healthcare provider if you should get a flu, pneumonia, shingles, or hepatitis B vaccine, and when to get the vaccine  · Keep all appointments  You may need to return to have your A1c checked every 3 months  You will need to return at least once each year to have your feet checked  You will need an eye exam once a year to check for retinopathy  You will also need urine tests every year to check for kidney problems  You may need tests to monitor for heart disease  Write down your questions so you remember to ask them during your visits  · Get help from family and friends    You may need help checking your blood sugar level, giving insulin injections, or preparing your meals  Ask your family and friends to help you with these tasks  Talk to your healthcare provider if you do not have someone at home to help you  A healthcare provider can come to your home to help you with these tasks  Follow up with your healthcare provider as directed: You may need to return to have your A1c checked every 3 months  You will need to return at least once each year to have your feet checked  You will need an eye exam once a year to check for retinopathy  You will also need urine tests every year to check for kidney problems  You may need tests to monitor for heart disease  Write down your questions so you remember to ask them during your visits  © 2017 [facility] Information is for End User's use only and may not be sold, redistributed or otherwise used for commercial purposes  All illustrations and images included in CareNotes® are the copyrighted property of [employer]  or Chidi Rodriguez  The above information is an  only  It is not intended as medical advice for individual conditions or treatments  Talk to your doctor, nurse or pharmacist before following any medical regimen to see if it is safe and effective for you  Basic Carbohydrate Counting   AMBULATORY CARE:   Carbohydrate counting  is a way to plan your meals by counting the amount of carbohydrate in foods  Carbohydrates are the sugars, starches, and fiber found in fruit, grains, vegetables, and milk products  Carbohydrates increase your blood sugar levels  Carbohydrate counting can help you eat the right amount of carbohydrate to keep your blood sugar levels under control  What you need to know about planning meals using carbohydrate counting:  · A dietitian or healthcare provider will help you develop a healthy meal plan that works best for you   You will be taught how much carbohydrate to eat or drink for each meal and snack  Your meal plan will be based on your age, weight, usual food intake, and physical activity level  If you have diabetes, it will also include your blood sugar levels and diabetes medicine  Once you know how much carbohydrate you should eat, you can decide what type of food you want to eat  · You will need to know what foods contain carbohydrate and how much they contain  Keep track of the amount of carbohydrate in meals and snacks in order to follow your meal plan  Do not avoid carbohydrates or skip meals  Your blood sugar may fall too low if you do not eat enough carbohydrate or you skip meals  Foods that contain carbohydrate:   · Breads:  Each serving of food listed below contains about 15 g of carbohydrate   ¨ 1 slice of bread (1 ounce) or 1 flour or corn tortilla (6 inch)    ¨ ½ of a hamburger bun or ¼ of a large bagel (about 1 ounce)    ¨ 1 pancake (about 4 inches across and ¼ inch thick)    · Cereals and grains:  Serving sizes of ready-to-eat cereals vary  Look at the serving size and the total carbohydrate amount listed on the food label  Each serving of food listed below contains about 15 g of carbohydrate   ¨ ¾ cup of dry, unsweetened, ready-to-eat cereal or ¼ cup of low-fat granola     ¨ ½ cup of oatmeal or other cooked cereal     ¨ ? cup of cooked rice or pasta    · Starchy vegetables and beans:  Each serving of food listed below contains about 15 g of carbohydrate   ¨ ½ cup of corn, green peas, sweet potatoes, or mashed potatoes    ¨ ¼ of a large baked potato    ¨ ½ cup of beans, lentils, and peas (garbanzo, rosas, kidney, white, split, black-eyed)    · Crackers and snacks:  Each serving of food listed below contains about 15 g of carbohydrate       ¨ 3 james cracker squares or 8 animal crackers     ¨ 6 saltine-type crackers    ¨ 3 cups of popcorn or ¾ ounce of pretzels, potato chips, or tortilla chips    · Fruit:  Each serving of food listed below contains about 15 g of carbohydrate   ¨ 1 small (4 ounce) piece of fresh fruit or ¾ to 1 cup of fresh fruit    ¨ ½ cup of canned or frozen fruit, packed in natural juice    ¨ ½ cup (4 ounces) of unsweetened fruit juice    ¨ 2 tablespoons of dried fruit    · Desserts or sugary foods:  Each serving of food listed below contains about 15 g of carbohydrate   ¨ 2-inch square unfrosted cake or brownie     ¨ 2 small cookies    ¨ ½ cup of ice cream, frozen yogurt, or nondairy frozen yogurt    ¨ ¼ cup of sherbet or sorbet    ¨ 1 tablespoon of regular syrup, jam, or jelly    ¨ 2 tablespoons of light syrup    · Milk and yogurt:  Foods from the milk group contain about 12 g of carbohydrate per serving  ¨ 1 cup of fat-free or low-fat milk    ¨ 1 cup of soy milk    ¨ ? cup of fat-free, yogurt sweetened with artificial sweetener    · Non-starchy vegetables:  Each serving contains about 5 g of carbohydrate   Three servings of non-starch vegetables count as 1 carbohydrate serving  ¨ ½ cup of cooked vegetables or 1 cup of raw vegetables  This includes beets, broccoli, cabbage, cauliflower, cucumber, mushrooms, tomatoes, and zucchini    ¨ ½ cup of vegetable juice  How to use carbohydrate counting to plan meals:   · Count carbohydrate amounts using serving sizes:      ¨ Pasta dinner example: You plan to have pasta, tossed salad, and an 8-ounce glass of milk  Your healthcare provider tells you that you may have 4 carbohydrate servings for dinner  One carbohydrate serving of pasta is ? cup  One cup of pasta will equal 3 carbohydrate servings  An 8-ounce glass of milk will count as 1 carbohydrate serving  These amounts of food would equal 4 carbohydrate servings  One cup of tossed salad does not count toward your carbohydrate servings  · Count carbohydrate amounts using food labels:  Find the total amount of carbohydrate in a packaged food by reading the food label   Food labels tell you the serving size of the food and the total carbohydrate amount in each serving  Find the serving size on the food label and then decide how many servings you will eat  Multiply the number of servings you plan to eat by the carbohydrate amount per serving  ¨ Granola bar snack example: Your meal plan allows you to have 2 carbohydrate servings (30 grams) of carbohydrate for a snack  You plan to eat 1 package of granola bars, which contains 2 bars  According to the food label, the serving size of food in this package is 1 bar  Each serving (1 bar) contains 25 grams of carbohydrate  The total amount of carbohydrate in this package of granola bars would be 50 g  Based on your meal plan, you should eat only 1 bar  Follow up with your healthcare provider as directed:  Write down your questions so you remember to ask them during your visits  © 2017 [facility] Information is for End User's use only and may not be sold, redistributed or otherwise used for commercial purposes  All illustrations and images included in CareNotes® are the copyrighted property of [employer]  or Chidi Rodriguez  The above information is an  only  It is not intended as medical advice for individual conditions or treatments  Talk to your doctor, nurse or pharmacist before following any medical regimen to see if it is safe and effective for you

## 2018-03-06 ENCOUNTER — OFFICE VISIT (OUTPATIENT)
Dept: FAMILY MEDICINE CLINIC | Facility: CLINIC | Age: 73
End: 2018-03-06
Payer: COMMERCIAL

## 2018-03-06 VITALS
BODY MASS INDEX: 30.22 KG/M2 | WEIGHT: 177 LBS | SYSTOLIC BLOOD PRESSURE: 120 MMHG | HEIGHT: 64 IN | DIASTOLIC BLOOD PRESSURE: 70 MMHG

## 2018-03-06 DIAGNOSIS — M54.42 ACUTE LEFT-SIDED LOW BACK PAIN WITH LEFT-SIDED SCIATICA: Primary | ICD-10-CM

## 2018-03-06 PROCEDURE — 99213 OFFICE O/P EST LOW 20 MIN: CPT | Performed by: FAMILY MEDICINE

## 2018-03-06 RX ORDER — NAPROXEN 500 MG/1
500 TABLET ORAL 2 TIMES DAILY WITH MEALS
Qty: 60 TABLET | Refills: 0 | Status: SHIPPED | OUTPATIENT
Start: 2018-03-06 | End: 2018-04-09 | Stop reason: SDUPTHER

## 2018-03-06 NOTE — PATIENT INSTRUCTIONS
Acute Low Back Pain   AMBULATORY CARE:   Acute low back pain  is sudden discomfort in your lower back area that lasts for up to 6 weeks  The discomfort makes it difficult to tolerate activity  Common symptoms include the following:   · Back stiffness or spasms    · Pain down the back or side of one leg    · Holding yourself in an unusual position or posture to decrease your back pain    · Not being able to find a sitting position that is comfortable    · Slow increase in your pain for 24 to 48 hours after you stress your back    · Tenderness on your lower back or severe pain when you move your back  Seek immediate care for the following symptoms:   · Severe pain    · Sudden stiffness and heaviness in both buttocks down to both legs    · Numbness or weakness in one leg, or pain in both legs    · Numbness in your genital area or across your lower back    · Unable to control your urine or bowel movements  Contact your healthcare provider if:   · You have a fever  · You have pain at night or when you rest     · Your pain does not get better with treatment  · You have pain that worsens when you cough or sneeze  · You suddenly feel something pop or snap in your back  · You have questions or concerns about your condition or care  The goal of treatment for acute low back pain  is to relieve your pain and help you tolerate activity  Most people with acute lower back pain get better within 4 to 6 weeks  You may need any of the following:  · Acetaminophen  decreases pain  It is available without a doctor's order  Ask how much to take and how often to take it  Follow directions  Acetaminophen can cause liver damage if not taken correctly  · NSAIDs  help decrease swelling and pain  This medicine is available with or without a doctor's order  NSAIDs can cause stomach bleeding or kidney problems in certain people   If you take blood thinner medicine, always ask your healthcare provider if NSAIDs are safe for you  Always read the medicine label and follow directions  · Prescription pain medicine  may be given  Ask your healthcare provider how to take this medicine safely  · Muscle relaxers  decrease pain by relaxing the muscles in your lower spine  Manage your symptoms:   · Stay active  as much as you can without causing more pain  Bed rest could make your back pain worse  Start with some light exercises such as walking  Avoid heavy lifting until your pain is gone  Ask for more information about the activities or exercises that are right for you  · Ice  helps decrease swelling, pain, and muscle spams  Put crushed ice in a plastic bag  Cover it with a towel  Place it on your lower back for 20 to 30 minutes every 2 hours  Do this for about 2 to 3 days after your pain starts, or as directed  · Heat  helps decrease pain and muscle spasms  Start to use heat after treatment with ice has stopped  Use a small towel dampened with warm water or a heating pad, or sit in a warm bath  Apply heat on the area for 20 to 30 minutes every 2 hours for as many days as directed  Alternate heat and ice  Prevent acute low back pain:   · Use proper body mechanics  ¨ Bend at the hips and knees when you  objects  Do not bend from the waist  Use your leg muscles as you lift the load  Do not use your back  Keep the object close to your chest as you lift it  Try not to twist or lift anything above your waist     ¨ Change your position often when you stand for long periods of time  Rest one foot on a small box or footrest, and then switch to the other foot often  ¨ Try not to sit for long periods of time  When you do, sit in a straight-backed chair with your feet flat on the floor  Never reach, pull, or push while you are sitting  · Do exercises that strengthen your back muscles  Warm up before you exercise  Ask your healthcare provider the best exercises for you  · Maintain a healthy weight    Ask your healthcare provider how much you should weigh  Ask him to help you create a weight loss plan if you are overweight  Follow up with your healthcare provider as directed:  Return for a follow-up visit if you still have pain after 1 to 3 weeks of treatment  You may need to visit an orthopedist if your back pain lasts more than 12 weeks  Write down your questions so you remember to ask them during your visits  © 2017 [address] Information is for End User's use only and may not be sold, redistributed or otherwise used for commercial purposes  All illustrations and images included in CareNotes® are the copyrighted property of [employer]  or Chidi Rodriguez  The above information is an  only  It is not intended as medical advice for individual conditions or treatments  Talk to your doctor, nurse or pharmacist before following any medical regimen to see if it is safe and effective for you

## 2018-03-06 NOTE — PROGRESS NOTES
Assessment/Plan:    Acute left-sided low back pain with left-sided sciatica  Patient given back exercise and also will take the naprosyn as directed Fatou in 2 weeks       There are no diagnoses linked to this encounter  Subjective:   Chief Complaint   Patient presents with    Back Pain      x 3-4 days           Patient ID: Rand Ozuna is a 81 y o  male  Patient started 4 days ago with low back pain that radiates on the left side Patient has not had any weakness in the legs He has no incontinence of bowel or bladder patient has had this before and was on medications He does not have any new injury patient has no weakness in legs and no tingling in legs      Back Pain   This is a new problem  The current episode started in the past 7 days  The problem occurs constantly  The problem is unchanged  The pain is present in the lumbar spine  The quality of the pain is described as shooting and stabbing  The pain radiates to the left thigh  The pain is at a severity of 8/10  The pain is moderate  The pain is the same all the time  The symptoms are aggravated by bending  Stiffness is present all day  Pertinent negatives include no abdominal pain, bladder incontinence, bowel incontinence, chest pain, dysuria, fever, headaches, leg pain, numbness, paresis, paresthesias, pelvic pain, perianal numbness, tingling, weakness or weight loss  He has tried analgesics for the symptoms  The treatment provided mild relief  The following portions of the patient's history were reviewed and updated as appropriate: allergies, current medications, past social history and problem list     Review of Systems   Constitutional: Negative for fever and weight loss  Cardiovascular: Negative for chest pain  Gastrointestinal: Negative for abdominal pain and bowel incontinence  Genitourinary: Negative for bladder incontinence, dysuria and pelvic pain  Musculoskeletal: Positive for back pain  Skin: Negative for rash  Neurological: Negative for tingling, weakness, numbness, headaches and paresthesias  Objective:      /70   Ht 5' 4" (1 626 m)   Wt 80 3 kg (177 lb)   BMI 30 38 kg/m²          Physical Exam   Constitutional: He is oriented to person, place, and time  He appears well-developed and well-nourished  Musculoskeletal:        Lumbar back: He exhibits decreased range of motion, tenderness, pain and spasm  Back:    Neurological: He is alert and oriented to person, place, and time  He has normal reflexes  He displays normal reflexes  No cranial nerve deficit  He exhibits normal muscle tone  Coordination normal    Skin: No rash noted  Psychiatric: He has a normal mood and affect   His behavior is normal  Judgment and thought content normal

## 2018-03-13 ENCOUNTER — OFFICE VISIT (OUTPATIENT)
Dept: FAMILY MEDICINE CLINIC | Facility: CLINIC | Age: 73
End: 2018-03-13
Payer: COMMERCIAL

## 2018-03-13 VITALS
SYSTOLIC BLOOD PRESSURE: 120 MMHG | WEIGHT: 179.8 LBS | DIASTOLIC BLOOD PRESSURE: 78 MMHG | BODY MASS INDEX: 30.7 KG/M2 | HEIGHT: 64 IN

## 2018-03-13 DIAGNOSIS — R42 VERTIGO: ICD-10-CM

## 2018-03-13 DIAGNOSIS — H65.22 LEFT CHRONIC SEROUS OTITIS MEDIA: Primary | ICD-10-CM

## 2018-03-13 PROCEDURE — 99213 OFFICE O/P EST LOW 20 MIN: CPT | Performed by: FAMILY MEDICINE

## 2018-03-13 PROCEDURE — 1160F RVW MEDS BY RX/DR IN RCRD: CPT | Performed by: FAMILY MEDICINE

## 2018-03-13 RX ORDER — AMOXICILLIN 875 MG/1
875 TABLET, COATED ORAL 2 TIMES DAILY
Qty: 20 TABLET | Refills: 0 | Status: SHIPPED | OUTPATIENT
Start: 2018-03-13 | End: 2018-03-23

## 2018-03-13 NOTE — PROGRESS NOTES
Assessment/Plan:    No problem-specific Assessment & Plan notes found for this encounter  There are no diagnoses linked to this encounter  Subjective:   Chief Complaint   Patient presents with    Dizziness     pressure in ears x 4-5 days           Patient ID: Sherolyn Cockayne is a 81 y o  male  Patient is here for dizziness for last 4-5 days with lying down, rolling over or turning his head Patient left ear is blocked and painful this whole time too Patient has seen ENT in the past He is using his allergy meds (fluticasone, and singulair) Patient has had PT in the past also Patient has no fever or chills Patient had the same issue this time last year         The following portions of the patient's history were reviewed and updated as appropriate: allergies, current medications, past social history and problem list     Review of Systems   Constitutional: Negative for activity change and appetite change  HENT: Positive for congestion, ear pain, hearing loss and rhinorrhea  Negative for postnasal drip, sinus pain and sinus pressure  Eyes: Negative  Respiratory: Negative for cough and shortness of breath  Cardiovascular: Negative for chest pain  Gastrointestinal: Negative for abdominal pain, diarrhea and nausea  Neurological: Positive for dizziness  Objective:      /78   Ht 5' 4" (1 626 m)   Wt 81 6 kg (179 lb 12 8 oz)   BMI 30 86 kg/m²          Physical Exam   Constitutional: He is oriented to person, place, and time  He appears well-developed and well-nourished  HENT:   Head: Normocephalic and atraumatic  Right Ear: Hearing, tympanic membrane, external ear and ear canal normal    Left Ear: There is tenderness  Tympanic membrane is injected and erythematous  A middle ear effusion is present  Decreased hearing is noted  Ears:    Eyes: Conjunctivae and EOM are normal  Pupils are equal, round, and reactive to light  Neck: Neck supple  No thyromegaly present  Cardiovascular: Normal rate and normal heart sounds  Pulmonary/Chest: Effort normal and breath sounds normal  He has no wheezes  He has no rales  Abdominal: Soft  Bowel sounds are normal  He exhibits no distension  There is no tenderness  Musculoskeletal: He exhibits no edema or tenderness  Lymphadenopathy:     He has no cervical adenopathy  Neurological: He is alert and oriented to person, place, and time  No cranial nerve deficit  Coordination normal    Skin: Skin is warm and dry  No rash noted  Psychiatric: He has a normal mood and affect   His behavior is normal  Judgment and thought content normal

## 2018-03-22 LAB
LEFT EYE DIABETIC RETINOPATHY: NORMAL
RIGHT EYE DIABETIC RETINOPATHY: NORMAL
SEVERITY (EYE EXAM): NORMAL

## 2018-03-26 PROCEDURE — 3072F LOW RISK FOR RETINOPATHY: CPT | Performed by: FAMILY MEDICINE

## 2018-03-31 DIAGNOSIS — E11.9 TYPE 2 DIABETES MELLITUS WITHOUT COMPLICATION, WITHOUT LONG-TERM CURRENT USE OF INSULIN (HCC): Primary | ICD-10-CM

## 2018-04-01 RX ORDER — GLIMEPIRIDE 2 MG/1
TABLET ORAL
Qty: 120 TABLET | Refills: 5 | Status: SHIPPED | OUTPATIENT
Start: 2018-04-01 | End: 2018-09-25 | Stop reason: SDUPTHER

## 2018-04-09 DIAGNOSIS — M54.42 ACUTE LEFT-SIDED LOW BACK PAIN WITH LEFT-SIDED SCIATICA: ICD-10-CM

## 2018-04-10 RX ORDER — NAPROXEN 500 MG/1
TABLET ORAL
Qty: 60 TABLET | Refills: 0 | Status: SHIPPED | OUTPATIENT
Start: 2018-04-10 | End: 2018-05-31 | Stop reason: SDUPTHER

## 2018-04-29 DIAGNOSIS — E11.9 TYPE 2 DIABETES MELLITUS WITHOUT COMPLICATION, WITHOUT LONG-TERM CURRENT USE OF INSULIN (HCC): ICD-10-CM

## 2018-04-29 DIAGNOSIS — N40.0 BENIGN PROSTATIC HYPERPLASIA WITHOUT LOWER URINARY TRACT SYMPTOMS: Primary | ICD-10-CM

## 2018-04-30 RX ORDER — FINASTERIDE 5 MG/1
TABLET, FILM COATED ORAL
Qty: 30 TABLET | Refills: 5 | Status: SHIPPED | OUTPATIENT
Start: 2018-04-30 | End: 2018-10-22 | Stop reason: SDUPTHER

## 2018-05-31 DIAGNOSIS — M54.42 ACUTE LEFT-SIDED LOW BACK PAIN WITH LEFT-SIDED SCIATICA: ICD-10-CM

## 2018-05-31 RX ORDER — NAPROXEN 500 MG/1
TABLET ORAL
Qty: 60 TABLET | Refills: 0 | Status: SHIPPED | OUTPATIENT
Start: 2018-05-31 | End: 2018-08-30 | Stop reason: ALTCHOICE

## 2018-06-07 DIAGNOSIS — I10 ESSENTIAL HYPERTENSION: Primary | ICD-10-CM

## 2018-06-07 RX ORDER — AMLODIPINE BESYLATE 10 MG/1
TABLET ORAL
Qty: 90 TABLET | Refills: 1 | Status: SHIPPED | OUTPATIENT
Start: 2018-06-07 | End: 2018-12-03 | Stop reason: SDUPTHER

## 2018-06-08 DIAGNOSIS — I10 ESSENTIAL HYPERTENSION: Primary | ICD-10-CM

## 2018-06-08 RX ORDER — LISINOPRIL AND HYDROCHLOROTHIAZIDE 20; 12.5 MG/1; MG/1
1 TABLET ORAL DAILY
Qty: 90 TABLET | Refills: 1 | Status: SHIPPED | OUTPATIENT
Start: 2018-06-08 | End: 2018-12-03 | Stop reason: SDUPTHER

## 2018-06-27 DIAGNOSIS — E11.9 CONTROLLED TYPE 2 DIABETES MELLITUS WITHOUT COMPLICATION, WITHOUT LONG-TERM CURRENT USE OF INSULIN (HCC): Primary | ICD-10-CM

## 2018-06-27 DIAGNOSIS — R42 VERTIGO: Primary | ICD-10-CM

## 2018-06-28 RX ORDER — MECLIZINE HYDROCHLORIDE 25 MG/1
25 TABLET ORAL 3 TIMES DAILY PRN
Qty: 30 TABLET | Refills: 0 | Status: SHIPPED | OUTPATIENT
Start: 2018-06-28 | End: 2018-12-10 | Stop reason: SDUPTHER

## 2018-07-02 DIAGNOSIS — M54.42 ACUTE LEFT-SIDED LOW BACK PAIN WITH LEFT-SIDED SCIATICA: ICD-10-CM

## 2018-07-02 RX ORDER — NAPROXEN 500 MG/1
TABLET ORAL
Qty: 60 TABLET | Refills: 0 | OUTPATIENT
Start: 2018-07-02

## 2018-07-03 ENCOUNTER — TRANSCRIBE ORDERS (OUTPATIENT)
Dept: LAB | Facility: HOSPITAL | Age: 73
End: 2018-07-03

## 2018-07-03 ENCOUNTER — TRANSCRIBE ORDERS (OUTPATIENT)
Dept: RADIOLOGY | Facility: HOSPITAL | Age: 73
End: 2018-07-03

## 2018-07-03 ENCOUNTER — HOSPITAL ENCOUNTER (OUTPATIENT)
Dept: RADIOLOGY | Facility: HOSPITAL | Age: 73
Discharge: HOME | End: 2018-07-03
Payer: COMMERCIAL

## 2018-07-03 ENCOUNTER — APPOINTMENT (OUTPATIENT)
Dept: LAB | Facility: HOSPITAL | Age: 73
End: 2018-07-03
Payer: COMMERCIAL

## 2018-07-03 DIAGNOSIS — M35.00 SICCA SYNDROME (HCC): Primary | ICD-10-CM

## 2018-07-03 DIAGNOSIS — M35.00 SICCA SYNDROME (HCC): ICD-10-CM

## 2018-07-03 DIAGNOSIS — M67.911 DISORDER OF TENDON OF RIGHT SHOULDER REGION: ICD-10-CM

## 2018-07-03 DIAGNOSIS — M67.912 DISORDER OF TENDON OF LEFT SHOULDER REGION: ICD-10-CM

## 2018-07-03 DIAGNOSIS — M67.912 DISORDER OF TENDON OF LEFT SHOULDER REGION: Primary | ICD-10-CM

## 2018-07-03 LAB
ALBUMIN SERPL BCP-MCNC: 4.1 G/DL (ref 3.5–5)
ALP SERPL-CCNC: 83 U/L (ref 46–116)
ALT SERPL W P-5'-P-CCNC: 25 U/L (ref 12–78)
ANION GAP SERPL CALCULATED.3IONS-SCNC: 6 MMOL/L (ref 4–13)
AST SERPL W P-5'-P-CCNC: 14 U/L (ref 5–45)
BILIRUB SERPL-MCNC: 0.76 MG/DL (ref 0.2–1)
BUN SERPL-MCNC: 16 MG/DL (ref 5–25)
CALCIUM SERPL-MCNC: 9.5 MG/DL (ref 8.3–10.1)
CHLORIDE SERPL-SCNC: 101 MMOL/L (ref 100–108)
CO2 SERPL-SCNC: 30 MMOL/L (ref 21–32)
CREAT SERPL-MCNC: 1.06 MG/DL (ref 0.6–1.3)
CREAT UR-MCNC: 98.6 MG/DL
CRP SERPL QL: <3 MG/L
ERYTHROCYTE [DISTWIDTH] IN BLOOD BY AUTOMATED COUNT: 13.3 % (ref 11.6–15.1)
ERYTHROCYTE [SEDIMENTATION RATE] IN BLOOD: 3 MM/HOUR (ref 0–10)
GFR SERPL CREATININE-BSD FRML MDRD: 71 ML/MIN/1.73SQ M
GLUCOSE SERPL-MCNC: 86 MG/DL (ref 65–140)
HCT VFR BLD AUTO: 45.5 % (ref 36.5–49.3)
HGB BLD-MCNC: 14.6 G/DL (ref 12–17)
MCH RBC QN AUTO: 30.1 PG (ref 26.8–34.3)
MCHC RBC AUTO-ENTMCNC: 32.1 G/DL (ref 31.4–37.4)
MCV RBC AUTO: 94 FL (ref 82–98)
PLATELET # BLD AUTO: 287 THOUSANDS/UL (ref 149–390)
PMV BLD AUTO: 10.4 FL (ref 8.9–12.7)
POTASSIUM SERPL-SCNC: 4.1 MMOL/L (ref 3.5–5.3)
PROT SERPL-MCNC: 7.7 G/DL (ref 6.4–8.2)
PROT UR-MCNC: 10 MG/DL
PROT/CREAT UR: 0.1 MG/G{CREAT} (ref 0–0.1)
RBC # BLD AUTO: 4.85 MILLION/UL (ref 3.88–5.62)
SODIUM SERPL-SCNC: 137 MMOL/L (ref 136–145)
WBC # BLD AUTO: 8.94 THOUSAND/UL (ref 4.31–10.16)

## 2018-07-03 PROCEDURE — 86140 C-REACTIVE PROTEIN: CPT

## 2018-07-03 PROCEDURE — 80053 COMPREHEN METABOLIC PANEL: CPT

## 2018-07-03 PROCEDURE — 82570 ASSAY OF URINE CREATININE: CPT

## 2018-07-03 PROCEDURE — 84156 ASSAY OF PROTEIN URINE: CPT

## 2018-07-03 PROCEDURE — 86430 RHEUMATOID FACTOR TEST QUAL: CPT

## 2018-07-03 PROCEDURE — 85027 COMPLETE CBC AUTOMATED: CPT

## 2018-07-03 PROCEDURE — 36415 COLL VENOUS BLD VENIPUNCTURE: CPT

## 2018-07-03 PROCEDURE — 86235 NUCLEAR ANTIGEN ANTIBODY: CPT

## 2018-07-03 PROCEDURE — 85652 RBC SED RATE AUTOMATED: CPT

## 2018-07-03 PROCEDURE — 73030 X-RAY EXAM OF SHOULDER: CPT

## 2018-07-03 PROCEDURE — 86200 CCP ANTIBODY: CPT

## 2018-07-04 LAB — RHEUMATOID FACT SER QL LA: NEGATIVE

## 2018-07-05 LAB
CCP IGA+IGG SERPL IA-ACNC: 8 UNITS (ref 0–19)
ENA SS-A AB SER-ACNC: <0.2 AI (ref 0–0.9)
ENA SS-B AB SER-ACNC: <0.2 AI (ref 0–0.9)

## 2018-07-06 ENCOUNTER — CLINICAL SUPPORT (OUTPATIENT)
Dept: FAMILY MEDICINE CLINIC | Facility: CLINIC | Age: 73
End: 2018-07-06
Payer: COMMERCIAL

## 2018-07-06 DIAGNOSIS — Z23 NEED FOR DIPHTHERIA-TETANUS-PERTUSSIS (TDAP) VACCINE: Primary | ICD-10-CM

## 2018-07-06 PROCEDURE — 90715 TDAP VACCINE 7 YRS/> IM: CPT | Performed by: FAMILY MEDICINE

## 2018-07-06 PROCEDURE — 90471 IMMUNIZATION ADMIN: CPT | Performed by: FAMILY MEDICINE

## 2018-07-28 DIAGNOSIS — E11.9 TYPE 2 DIABETES MELLITUS WITHOUT COMPLICATION, WITHOUT LONG-TERM CURRENT USE OF INSULIN (HCC): ICD-10-CM

## 2018-07-30 RX ORDER — SITAGLIPTIN 25 MG/1
TABLET, FILM COATED ORAL
Qty: 30 TABLET | Refills: 5 | Status: SHIPPED | OUTPATIENT
Start: 2018-07-30 | End: 2018-09-14 | Stop reason: SDUPTHER

## 2018-08-09 PROCEDURE — 88305 TISSUE EXAM BY PATHOLOGIST: CPT | Performed by: PATHOLOGY

## 2018-08-10 ENCOUNTER — LAB REQUISITION (OUTPATIENT)
Dept: LAB | Facility: HOSPITAL | Age: 73
End: 2018-08-10
Payer: COMMERCIAL

## 2018-08-10 DIAGNOSIS — R68.2 DRY MOUTH: ICD-10-CM

## 2018-08-28 ENCOUNTER — TELEPHONE (OUTPATIENT)
Dept: FAMILY MEDICINE CLINIC | Facility: CLINIC | Age: 73
End: 2018-08-28

## 2018-08-28 DIAGNOSIS — J45.40 MODERATE PERSISTENT ASTHMA, UNSPECIFIED WHETHER COMPLICATED: Primary | ICD-10-CM

## 2018-08-28 RX ORDER — MONTELUKAST SODIUM 10 MG/1
TABLET ORAL
Qty: 90 TABLET | Refills: 1 | Status: SHIPPED | OUTPATIENT
Start: 2018-08-28 | End: 2018-09-15 | Stop reason: SDUPTHER

## 2018-08-28 NOTE — TELEPHONE ENCOUNTER
He has had a several different labs Have him make his OV in AM and we will determine what labs he needs and I will have them drawn the day of his visit Otherwise I am afraid he may get billed for things as they could be duplicates

## 2018-08-30 ENCOUNTER — OFFICE VISIT (OUTPATIENT)
Dept: FAMILY MEDICINE CLINIC | Facility: CLINIC | Age: 73
End: 2018-08-30
Payer: COMMERCIAL

## 2018-08-30 VITALS
BODY MASS INDEX: 30.39 KG/M2 | SYSTOLIC BLOOD PRESSURE: 122 MMHG | HEIGHT: 64 IN | TEMPERATURE: 97.1 F | DIASTOLIC BLOOD PRESSURE: 60 MMHG | WEIGHT: 178 LBS

## 2018-08-30 DIAGNOSIS — N40.0 ENLARGED PROSTATE WITHOUT LOWER URINARY TRACT SYMPTOMS (LUTS): ICD-10-CM

## 2018-08-30 DIAGNOSIS — I10 ESSENTIAL HYPERTENSION: ICD-10-CM

## 2018-08-30 DIAGNOSIS — M35.01 SJOGREN'S SYNDROME WITH KERATOCONJUNCTIVITIS SICCA (HCC): ICD-10-CM

## 2018-08-30 DIAGNOSIS — J31.0 CHRONIC RHINITIS: ICD-10-CM

## 2018-08-30 DIAGNOSIS — E11.9 TYPE 2 DIABETES MELLITUS WITHOUT COMPLICATION, WITHOUT LONG-TERM CURRENT USE OF INSULIN (HCC): Primary | ICD-10-CM

## 2018-08-30 PROCEDURE — 99214 OFFICE O/P EST MOD 30 MIN: CPT | Performed by: FAMILY MEDICINE

## 2018-08-30 PROCEDURE — 4040F PNEUMOC VAC/ADMIN/RCVD: CPT | Performed by: FAMILY MEDICINE

## 2018-08-30 PROCEDURE — 3078F DIAST BP <80 MM HG: CPT | Performed by: FAMILY MEDICINE

## 2018-08-30 PROCEDURE — 3074F SYST BP LT 130 MM HG: CPT | Performed by: FAMILY MEDICINE

## 2018-08-30 PROCEDURE — 36415 COLL VENOUS BLD VENIPUNCTURE: CPT | Performed by: FAMILY MEDICINE

## 2018-08-30 PROCEDURE — 3008F BODY MASS INDEX DOCD: CPT | Performed by: FAMILY MEDICINE

## 2018-08-30 RX ORDER — CELECOXIB 100 MG/1
100 CAPSULE ORAL 2 TIMES DAILY
COMMUNITY
End: 2018-11-09 | Stop reason: SDUPTHER

## 2018-08-30 NOTE — ASSESSMENT & PLAN NOTE
Patient will continue with same blood pressure medications Patient will continue low salt diet and also exercie

## 2018-08-30 NOTE — PROGRESS NOTES
Assessment/Plan:    Sjogren's syndrome ([facility] [address] )  Continue with current meds and follwoup with the rheumatologist    Type 2 diabetes mellitus without complication, without long-term current use of insulin ([facility] )  Lab Results   Component Value Date    HGBA1C 7 6 (H) 02/16/2018       No results for input(s): POCGLU in the last 72 hours  Blood Sugar Average: Last 72 hrs:  Patient sugars per logs seem to be good  Patient will have A1c done now Patient to continue with yearly eye doctor visits Foot exam done today    Chronic rhinitis  Continue with current medicaiton    Essential hypertension  Patient will continue with same blood pressure medications Patient will continue low salt diet and also exercie    Enlarged prostate without lower urinary tract symptoms (luts)  Patient  Is stable Paitent will continue with proscar       Diagnoses and all orders for this visit:    Type 2 diabetes mellitus without complication, without long-term current use of insulin (HCC)  -     Lipid panel  -     Hemoglobin A1c (w/out EAG)    Essential hypertension  -     Lipid panel  -     Hemoglobin A1c (w/out EAG)    Sjogren's syndrome with keratoconjunctivitis sicca (HCC)    Enlarged prostate without lower urinary tract symptoms (luts)    Chronic rhinitis    Other orders  -     celecoxib (CeleBREX) 100 mg capsule; Take 100 mg by mouth 2 (two) times a day          Subjective:   Chief Complaint   Patient presents with    Follow-up    Hypertension    Diabetes          Patient ID: Elias Ford is a 81 y o  male      Patient is here for follwoup of diabetes, hypertension, sjorgen's, BPH and chornic rhinitis Patient sugars have been good He saw eye doctor in March 2018 He sees Dr Priscila Matthews for foot exams Patient fasting sugars <120a nd post meals <150 Patient is walking as much as he can He has not had any issues recently with this rhinitis Patient is seeing rheumatology and is on celebrex and doing well Patient is doing well with urination Patient continues on proscare and saw urology in 12/2017 and was told prn follwoup Patient deneis any complaints  Today Patient last labs reveiwed        The following portions of the patient's history were reviewed and updated as appropriate: allergies, current medications, past social history and problem list     Review of Systems   Constitutional: Negative for fatigue, fever and unexpected weight change  HENT: Negative for congestion, sinus pain and trouble swallowing  Eyes: Negative for discharge and visual disturbance  Respiratory: Negative for cough, chest tightness, shortness of breath and wheezing  Cardiovascular: Negative for chest pain, palpitations and leg swelling  Gastrointestinal: Negative for abdominal pain, blood in stool, constipation, diarrhea, nausea and vomiting  Genitourinary: Negative for difficulty urinating, dysuria, frequency and hematuria  Musculoskeletal: Negative for arthralgias, gait problem and joint swelling  Skin: Negative for rash and wound  Allergic/Immunologic: Negative for environmental allergies and food allergies  Neurological: Negative for dizziness, syncope, weakness, numbness and headaches  Hematological: Negative for adenopathy  Does not bruise/bleed easily  Psychiatric/Behavioral: Negative for confusion, decreased concentration and sleep disturbance  The patient is not nervous/anxious  Objective:      /60   Temp (!) 97 1 °F (36 2 °C)   Ht 5' 4" (1 626 m)   Wt 80 7 kg (178 lb)   BMI 30 55 kg/m²          Physical Exam   Constitutional: He is oriented to person, place, and time  He appears well-developed and well-nourished  HENT:   Head: Normocephalic and atraumatic  Right Ear: Hearing, tympanic membrane and external ear normal    Left Ear: Hearing, tympanic membrane and external ear normal    Eyes: Conjunctivae and EOM are normal  Pupils are equal, round, and reactive to light  Neck: Neck supple  No thyromegaly present  Cardiovascular: Normal rate and normal heart sounds  Pulses are no weak pulses  Pulses:       Dorsalis pedis pulses are 2+ on the right side, and 2+ on the left side  Posterior tibial pulses are 2+ on the right side, and 2+ on the left side  Pulmonary/Chest: Effort normal and breath sounds normal  He has no wheezes  He has no rales  Abdominal: Soft  Bowel sounds are normal  He exhibits no distension  There is no tenderness  Musculoskeletal: He exhibits no edema or tenderness  Feet:   Right Foot:   Skin Integrity: Negative for ulcer, skin breakdown, erythema, warmth, callus or dry skin  Left Foot:   Skin Integrity: Negative for ulcer, skin breakdown, erythema, warmth, callus or dry skin  Lymphadenopathy:     He has no cervical adenopathy  Neurological: He is alert and oriented to person, place, and time  No cranial nerve deficit  Coordination normal    Skin: Skin is warm and dry  No rash noted  Psychiatric: He has a normal mood and affect  His behavior is normal  Judgment and thought content normal      Patient's shoes and socks removed  Right Foot/Ankle   Right Foot Inspection  Skin Exam: skin normal and skin intact no dry skin, no warmth, no callus, no erythema, no maceration, no abnormal color, no pre-ulcer, no ulcer and no callus                          Toe Exam: ROM and strength within normal limits  Sensory   Vibration: intact  Proprioception: intact   Monofilament testing: intact  Vascular  Capillary refills: < 3 seconds  The right DP pulse is 2+  The right PT pulse is 2+  Left Foot/Ankle  Left Foot Inspection  Skin Exam: skin normal and skin intactno dry skin, no warmth, no erythema, no maceration, normal color, no pre-ulcer, no ulcer and no callus                         Toe Exam: ROM and strength within normal limits                   Sensory   Vibration: intact  Proprioception: intact  Monofilament: intact  Vascular  Capillary refills: < 3 seconds  The left DP pulse is 2+  The left PT pulse is 2+  Assign Risk Category:  No deformity present; No loss of protective sensation;  No weak pulses       Risk: 0

## 2018-08-30 NOTE — PATIENT INSTRUCTIONS
Patient sugars appear good based on his logs His A1c was drawn today and he will be contacted with results Patient will continue yearly eye exam Pateint will see me in 6 months Patient to have his rheumatology follwoup also

## 2018-08-30 NOTE — ASSESSMENT & PLAN NOTE
Lab Results   Component Value Date    HGBA1C 7 6 (H) 02/16/2018       No results for input(s): POCGLU in the last 72 hours      Blood Sugar Average: Last 72 hrs:  Patient sugars per logs seem to be good  Patient will have A1c done now Patient to continue with yearly eye doctor visits Foot exam done today

## 2018-08-31 LAB — SL AMB CONTAINER TYPE: NORMAL

## 2018-09-06 LAB
CHOLEST SERPL-MCNC: 120 MG/DL
CHOLEST/HDLC SERPL: 2.6 (CALC)
HDLC SERPL-MCNC: 47 MG/DL
LDLC SERPL CALC-MCNC: 60 MG/DL (CALC)
NONHDLC SERPL-MCNC: 73 MG/DL (CALC)
TRIGL SERPL-MCNC: 54 MG/DL

## 2018-09-14 DIAGNOSIS — E11.9 TYPE 2 DIABETES MELLITUS WITHOUT COMPLICATION, WITHOUT LONG-TERM CURRENT USE OF INSULIN (HCC): ICD-10-CM

## 2018-09-15 DIAGNOSIS — J45.40 MODERATE PERSISTENT ASTHMA, UNSPECIFIED WHETHER COMPLICATED: ICD-10-CM

## 2018-09-17 RX ORDER — MONTELUKAST SODIUM 10 MG/1
TABLET ORAL
Qty: 90 TABLET | Refills: 1 | Status: SHIPPED | OUTPATIENT
Start: 2018-09-17 | End: 2018-12-18 | Stop reason: SDUPTHER

## 2018-09-25 DIAGNOSIS — E11.9 TYPE 2 DIABETES MELLITUS WITHOUT COMPLICATION, WITHOUT LONG-TERM CURRENT USE OF INSULIN (HCC): ICD-10-CM

## 2018-09-25 RX ORDER — GLIMEPIRIDE 2 MG/1
TABLET ORAL
Qty: 120 TABLET | Refills: 5 | Status: SHIPPED | OUTPATIENT
Start: 2018-09-25 | End: 2018-12-18 | Stop reason: SDUPTHER

## 2018-10-01 ENCOUNTER — IMMUNIZATION (OUTPATIENT)
Dept: FAMILY MEDICINE CLINIC | Facility: CLINIC | Age: 73
End: 2018-10-01
Payer: COMMERCIAL

## 2018-10-01 DIAGNOSIS — Z23 NEED FOR INFLUENZA VACCINATION: Primary | ICD-10-CM

## 2018-10-01 PROCEDURE — 90662 IIV NO PRSV INCREASED AG IM: CPT

## 2018-10-01 PROCEDURE — G0008 ADMIN INFLUENZA VIRUS VAC: HCPCS

## 2018-10-01 NOTE — PROGRESS NOTES
Assessment/Plan:      There are no diagnoses linked to this encounter  Subjective:  Chief Complaint   Patient presents with    Injections     Fluzone high-dose 0 5 ml IM-left deltoid        Patient ID: Edna Giles is a 81 y o  male      HPI    Review of Systems      Objective:     Physical Exam

## 2018-10-22 DIAGNOSIS — N40.0 BENIGN PROSTATIC HYPERPLASIA WITHOUT LOWER URINARY TRACT SYMPTOMS: ICD-10-CM

## 2018-10-22 DIAGNOSIS — E11.9 TYPE 2 DIABETES MELLITUS WITHOUT COMPLICATION, WITHOUT LONG-TERM CURRENT USE OF INSULIN (HCC): ICD-10-CM

## 2018-10-22 RX ORDER — FINASTERIDE 5 MG/1
TABLET, FILM COATED ORAL
Qty: 30 TABLET | Refills: 5 | Status: SHIPPED | OUTPATIENT
Start: 2018-10-22 | End: 2018-12-18 | Stop reason: SDUPTHER

## 2018-11-09 DIAGNOSIS — M19.91 PRIMARY OSTEOARTHRITIS, UNSPECIFIED SITE: Primary | ICD-10-CM

## 2018-11-09 RX ORDER — CELECOXIB 100 MG/1
100 CAPSULE ORAL 2 TIMES DAILY
Qty: 60 CAPSULE | Refills: 3 | Status: SHIPPED | OUTPATIENT
Start: 2018-11-09 | End: 2018-12-18 | Stop reason: SDUPTHER

## 2018-12-03 DIAGNOSIS — I10 ESSENTIAL HYPERTENSION: ICD-10-CM

## 2018-12-03 RX ORDER — LISINOPRIL AND HYDROCHLOROTHIAZIDE 20; 12.5 MG/1; MG/1
TABLET ORAL
Qty: 90 TABLET | Refills: 1 | Status: SHIPPED | OUTPATIENT
Start: 2018-12-03 | End: 2018-12-18 | Stop reason: SDUPTHER

## 2018-12-03 RX ORDER — AMLODIPINE BESYLATE 10 MG/1
TABLET ORAL
Qty: 90 TABLET | Refills: 1 | Status: SHIPPED | OUTPATIENT
Start: 2018-12-03 | End: 2018-12-18 | Stop reason: SDUPTHER

## 2018-12-10 DIAGNOSIS — R42 VERTIGO: ICD-10-CM

## 2018-12-10 RX ORDER — MECLIZINE HYDROCHLORIDE 25 MG/1
25 TABLET ORAL 3 TIMES DAILY PRN
Qty: 30 TABLET | Refills: 2 | Status: SHIPPED | OUTPATIENT
Start: 2018-12-10 | End: 2018-12-18 | Stop reason: SDUPTHER

## 2018-12-18 ENCOUNTER — OFFICE VISIT (OUTPATIENT)
Dept: FAMILY MEDICINE CLINIC | Facility: CLINIC | Age: 73
End: 2018-12-18
Payer: COMMERCIAL

## 2018-12-18 VITALS
HEIGHT: 64 IN | SYSTOLIC BLOOD PRESSURE: 128 MMHG | WEIGHT: 177 LBS | BODY MASS INDEX: 30.22 KG/M2 | DIASTOLIC BLOOD PRESSURE: 78 MMHG

## 2018-12-18 DIAGNOSIS — J31.0 CHRONIC RHINITIS: ICD-10-CM

## 2018-12-18 DIAGNOSIS — E11.9 TYPE 2 DIABETES MELLITUS WITHOUT COMPLICATION, WITHOUT LONG-TERM CURRENT USE OF INSULIN (HCC): ICD-10-CM

## 2018-12-18 DIAGNOSIS — E11.9 CONTROLLED TYPE 2 DIABETES MELLITUS WITHOUT COMPLICATION, WITHOUT LONG-TERM CURRENT USE OF INSULIN (HCC): ICD-10-CM

## 2018-12-18 DIAGNOSIS — J30.1 ALLERGIC RHINITIS DUE TO POLLEN, UNSPECIFIED SEASONALITY: Primary | ICD-10-CM

## 2018-12-18 DIAGNOSIS — M19.91 PRIMARY OSTEOARTHRITIS, UNSPECIFIED SITE: ICD-10-CM

## 2018-12-18 DIAGNOSIS — J45.40 MODERATE PERSISTENT ASTHMA, UNSPECIFIED WHETHER COMPLICATED: ICD-10-CM

## 2018-12-18 DIAGNOSIS — R42 VERTIGO: ICD-10-CM

## 2018-12-18 DIAGNOSIS — N40.0 BENIGN PROSTATIC HYPERPLASIA WITHOUT LOWER URINARY TRACT SYMPTOMS: ICD-10-CM

## 2018-12-18 DIAGNOSIS — N40.0 ENLARGED PROSTATE WITHOUT LOWER URINARY TRACT SYMPTOMS (LUTS): ICD-10-CM

## 2018-12-18 DIAGNOSIS — I10 ESSENTIAL HYPERTENSION: ICD-10-CM

## 2018-12-18 PROBLEM — M54.42 ACUTE LEFT-SIDED LOW BACK PAIN WITH LEFT-SIDED SCIATICA: Status: RESOLVED | Noted: 2018-03-06 | Resolved: 2018-12-18

## 2018-12-18 PROCEDURE — 3074F SYST BP LT 130 MM HG: CPT | Performed by: FAMILY MEDICINE

## 2018-12-18 PROCEDURE — 99214 OFFICE O/P EST MOD 30 MIN: CPT | Performed by: FAMILY MEDICINE

## 2018-12-18 PROCEDURE — 3078F DIAST BP <80 MM HG: CPT | Performed by: FAMILY MEDICINE

## 2018-12-18 PROCEDURE — 3008F BODY MASS INDEX DOCD: CPT | Performed by: FAMILY MEDICINE

## 2018-12-18 PROCEDURE — 1160F RVW MEDS BY RX/DR IN RCRD: CPT | Performed by: FAMILY MEDICINE

## 2018-12-18 RX ORDER — CELECOXIB 100 MG/1
100 CAPSULE ORAL 2 TIMES DAILY
Qty: 60 CAPSULE | Refills: 0 | Status: SHIPPED | OUTPATIENT
Start: 2018-12-18

## 2018-12-18 RX ORDER — LISINOPRIL AND HYDROCHLOROTHIAZIDE 20; 12.5 MG/1; MG/1
1 TABLET ORAL DAILY
Qty: 90 TABLET | Refills: 0 | Status: SHIPPED | OUTPATIENT
Start: 2018-12-18

## 2018-12-18 RX ORDER — MONTELUKAST SODIUM 10 MG/1
10 TABLET ORAL
Qty: 90 TABLET | Refills: 0 | Status: SHIPPED | OUTPATIENT
Start: 2018-12-18

## 2018-12-18 RX ORDER — GLIMEPIRIDE 4 MG/1
TABLET ORAL
Qty: 180 TABLET | Refills: 0 | Status: SHIPPED | OUTPATIENT
Start: 2018-12-18

## 2018-12-18 RX ORDER — MECLIZINE HYDROCHLORIDE 25 MG/1
25 TABLET ORAL EVERY 8 HOURS SCHEDULED
Qty: 90 TABLET | Refills: 0 | Status: SHIPPED | OUTPATIENT
Start: 2018-12-18

## 2018-12-18 RX ORDER — FLUTICASONE PROPIONATE 50 MCG
2 SPRAY, SUSPENSION (ML) NASAL DAILY
Qty: 48 G | Refills: 0 | Status: SHIPPED | OUTPATIENT
Start: 2018-12-18

## 2018-12-18 RX ORDER — FINASTERIDE 5 MG/1
5 TABLET, FILM COATED ORAL DAILY
Qty: 90 TABLET | Refills: 0 | Status: SHIPPED | OUTPATIENT
Start: 2018-12-18 | End: 2019-04-02 | Stop reason: SDUPTHER

## 2018-12-18 RX ORDER — AMLODIPINE BESYLATE 10 MG/1
10 TABLET ORAL DAILY
Qty: 90 TABLET | Refills: 0 | Status: SHIPPED | OUTPATIENT
Start: 2018-12-18

## 2018-12-18 NOTE — PATIENT INSTRUCTIONS
Diabetes in the Older Adult   AMBULATORY CARE:   What you need to know if you are an older adult with diabetes:  Older adults with diabetes are at risk for heart disease, stroke, kidney disease, blindness, and nerve damage  You may also be at risk for any of the following:  · Poor nutrition or low blood sugar levels    · Confusion or problems with memory, attention, or learning new things    · Trouble controlling urination or frequent urinary tract infections    · Trouble with coordination or balance    · Falls and injuries    · Pain    · Depression    · Open sores on your legs or feet  The ABCs of diabetes: The ABCs stand for certain things you can do to manage or prevent problems caused by diabetes:  · A  stands for A1c test   This test shows the average amount of sugar in your blood over the past 2 to 3 months  High levels of sugar in your blood can cause damage to your heart, blood vessels, kidneys, feet, and eyes  Most older adults with diabetes should have an A1c level less than 7 5  Ask your healthcare provider if this A1c goal is right for you  Your provider can help you make changes if your A1c is too high  · B  stands for blood pressure   High blood pressure can increase your risk for a heart attack, stroke, or kidney disease  Most older adults with diabetes should have a systolic blood pressure (first number) of 140  Your diastolic blood pressure (second number) should be below 90  Ask your healthcare provider if these blood pressure goals are right for you  · C  stands for cholesterol   High levels of cholesterol can block your arteries and cause a heart attack or stroke  Ask your healthcare provider what your cholesterol levels should be  · S  stands for stop smoking   Nicotine and other chemicals in cigarettes and cigars can cause lung damage and make it more difficult to manage your diabetes    Call 911 if you have any of the following:   · You have any of the following signs of a stroke: ¨ Numbness or drooping on one side of your face     ¨ Weakness in an arm or leg    ¨ Confusion or difficulty speaking    ¨ Dizziness, a severe headache, or vision loss    · You have any of the following signs of a heart attack:      ¨ Squeezing, pressure, or pain in your chest that lasts longer than 5 minutes or returns    ¨ Discomfort or pain in your back, neck, jaw, stomach, or arm     ¨ Trouble breathing    ¨ Nausea or vomiting    ¨ Lightheadedness or a sudden cold sweat, especially with chest pain or trouble breathing  Seek care immediately if:   · You have severe abdominal pain, or the pain spreads to your back  You may also be vomiting  · You have trouble staying awake or focusing  · You are shaking or sweating  · You have blurred or double vision  · Your breath has a fruity, sweet smell  · Your breathing is deep and labored, or rapid and shallow  · Your heartbeat is fast and weak  · You fall and get hurt  Contact your healthcare provider if:   · You are vomiting or have diarrhea  · You have an upset stomach and cannot eat the foods on your meal plan  · You feel weak or more tired than usual      · You feel dizzy, have headaches, or are easily irritated  · Your skin is red, warm, dry, or swollen  · You have a wound that does not heal      · You have numbness in your arms or legs  · You have trouble coping with your illness, or you feel anxious or depressed  · You have problems with your memory  · You have changes in your vision  · You have questions or concerns about your condition or care  Treatment for diabetes  includes keeping your blood sugar at a normal level  You must eat the right foods, and exercise regularly  You may need medicine if you cannot control your blood sugar level with nutrition and exercise  You may also need medicine to lower your blood pressure or cholesterol, or medicine to prevent blood clots     Manage the ABCs and prevent problems caused by diabetes:   · Check your blood sugar levels as directed  Your healthcare provider will tell you when and how often to check during the day  Your healthcare provider will also tell you what your blood sugar levels should be before and after a meal  You may need to check for ketones in your urine or blood if your level is higher than directed  Write down your results and show them to your healthcare provider  Your provider may use the results to make changes to your medicine, food, or exercise schedules  Ask your healthcare provider for more information about how to treat a high or low blood sugar level  · Follow your meal plan as directed  A dietitian will help you make a meal plan to keep your blood sugar level steady and make sure you get enough nutrition  Do not skip meals  Your blood sugar level may drop too low if you have taken diabetes medicine and do not eat  Ask your healthcare provider about programs in your community that can deliver the meals to your home  · Try to be active for 30 to 60 minutes most days of the week  Exercise can help keep your blood sugar level steady, decrease your risk of heart disease, and help you lose weight  It can also help improve your balance and decrease your risk for falls  Work with your healthcare provider to create an exercise plan  Ask a family member or friend to exercise with you  Start slow and exercise for 5 to 10 minutes at a time  Examples of activities include walking or swimming  Include muscle strengthening activities 2 to 3 days each week  Include balance training 2 to 3 times each week  Activities that help increase balance include yoga and ana rosa chi      · Maintain a healthy weight  Ask your healthcare provider how much you should weigh  A healthy weight can help you control your diabetes and prevent heart disease  Ask your provider to help you create a weight loss plan if you are overweight   Together you can set manageable weight loss goals  · Do not smoke  Ask your healthcare provider for information if you currently smoke and need help to quit  Do not use e-cigarettes or smokeless tobacco in place of cigarettes or to help you quit  They still contain nicotine  · Manage stress  Stress may increase your blood sugar level  Deep breathing, muscle relaxation, and music may help you relax  Ask your healthcare provider for more information about these practices  Other ways to manage your diabetes:   · Check your feet every day for sores  Look at your whole foot, including the bottom, and between and under your toes  Check for wounds, corns, and calluses  Use a mirror to see the bottom of your feet  The skin on your feet may be shiny, tight, dry, or darker than normal  Your feet may also be cold and pale  Feel your feet by running your hands along the tops, bottoms, sides, and between your toes  Redness, swelling, and warmth are signs of blood flow problems that can lead to a foot ulcer  Do not try to remove corns or calluses yourself  · Wear medical alert identification  Wear medical alert jewelry or carry a card that says you have diabetes  Ask your healthcare provider where to get these items  · Ask about vaccines  You have a higher risk for serious illness if you get the flu, pneumonia, or hepatitis  Ask your healthcare provider if you should get a flu, pneumonia, shingles, or hepatitis B vaccine, and when to get the vaccine  · Keep all appointments  You may need to return to have your A1c checked every 3 months  You will need to return at least once each year to have your feet checked  You will need an eye exam once a year to check for retinopathy  You will also need urine tests every year to check for kidney problems  You may need tests to monitor for heart disease  Write down your questions so you remember to ask them during your visits  · Get help from family and friends    You may need help checking your blood sugar level, giving insulin injections, or preparing your meals  Ask your family and friends to help you with these tasks  Talk to your healthcare provider if you do not have someone at home to help you  A healthcare provider can come to your home to help you with these tasks  Follow up with your healthcare provider as directed: You may need to return to have your A1c checked every 3 months  You will need to return at least once each year to have your feet checked  You will need an eye exam once a year to check for retinopathy  You will also need urine tests every year to check for kidney problems  You may need tests to monitor for heart disease  Write down your questions so you remember to ask them during your visits  © 2017 [facility] Information is for End User's use only and may not be sold, redistributed or otherwise used for commercial purposes  All illustrations and images included in CareNotes® are the copyrighted property of [employer]  or Chidi Rodriguez  The above information is an  only  It is not intended as medical advice for individual conditions or treatments  Talk to your doctor, nurse or pharmacist before following any medical regimen to see if it is safe and effective for you

## 2018-12-18 NOTE — PROGRESS NOTES
Assessment/Plan:    Type 2 diabetes mellitus without complication, without long-term current use of insulin ([facility] [address] )  Lab Results   Component Value Date    HGBA1C 7 6 (H) 02/16/2018   Patient to have A1c done today patient will be leaving for [place] in 2 weeks Patient for now will continuge with the labs as scheduled patient will see eye doctor yearly Pateint to monitoro sugars fasting sugars shoud be <120 and post meals <160  No results for input(s): POCGLU in the last 72 hours  Blood Sugar Average: Last 72 hrs:      Chronic rhinitis  Continue flonase and the singulari    Essential hypertension  wellcontrolled continue with current meds and follwoup with doctor in [place] in the next 3 motnhs    Enlarged prostate without lower urinary tract symptoms (luts)  Yearly visit with urology recommended       Diagnoses and all orders for this visit:    Allergic rhinitis due to pollen, unspecified seasonality  -     fluticasone (FLONASE) 50 mcg/act nasal spray; 2 sprays into each nostril daily    Benign prostatic hyperplasia without lower urinary tract symptoms  -     finasteride (PROSCAR) 5 mg tablet; Take 1 tablet (5 mg total) by mouth daily    Controlled type 2 diabetes mellitus without complication, without long-term current use of insulin ([facility])  -     glucose blood (ONE TOUCH ULTRA TEST) test strip; Use as instructed  -     Cancel: POCT hemoglobin A1c  -     Hemoglobin A1c (w/out EAG)    Essential hypertension  -     amLODIPine (NORVASC) 10 mg tablet; Take 1 tablet (10 mg total) by mouth daily  -     lisinopril-hydrochlorothiazide (PRINZIDE,ZESTORETIC) 20-12 5 MG per tablet; Take 1 tablet by mouth daily    Moderate persistent asthma, unspecified whether complicated  -     montelukast (SINGULAIR) 10 mg tablet; Take 1 tablet (10 mg total) by mouth daily at bedtime    Primary osteoarthritis, unspecified site  -     celecoxib (CeleBREX) 100 mg capsule;  Take 1 capsule (100 mg total) by mouth 2 (two) times a day    Type 2 diabetes mellitus without complication, without long-term current use of insulin ([facility])  -     metFORMIN (GLUCOPHAGE) 1000 MG tablet; Take 1 tablet (1,000 mg total) by mouth 2 (two) times a day  -     sitaGLIPtin (JANUVIA) 25 mg tablet; Take 1 tablet (25 mg total) by mouth daily  -     glimepiride (AMARYL) 4 mg tablet; 1 twice daily    Vertigo  -     meclizine (ANTIVERT) 25 mg tablet; Take 1 tablet (25 mg total) by mouth every 8 (eight) hours    Enlarged prostate without lower urinary tract symptoms (luts)    Chronic rhinitis          Subjective:   Chief Complaint   Patient presents with    Hypertension    Diabetes     refill all meds printed           Patient ID: Leann Cruz is a 72 y o  male  Patient is here for one last visit prior to moving to [place] Patient is here for folwloup of daibetes, hypertension, BPH and his allergies and arhtritis Pateint is overall doing well He states his sugars are <140 all the time Patient is due for labs Patient is taking all meds with out any isseus His BPH is good gets up one time night Patient allergies are controlled with meds His vertigo is aggravated by the flying back and forth that he is doing No other concerns at this time        The following portions of the patient's history were reviewed and updated as appropriate: allergies, current medications, past social history and problem list     Review of Systems   Constitutional: Negative for fatigue, fever and unexpected weight change  HENT: Negative for congestion, sinus pain and trouble swallowing  Eyes: Negative for discharge and visual disturbance  Respiratory: Negative for cough, chest tightness, shortness of breath and wheezing  Cardiovascular: Negative for chest pain, palpitations and leg swelling  Gastrointestinal: Negative for abdominal pain, blood in stool, constipation, diarrhea, nausea and vomiting  Genitourinary: Negative for difficulty urinating, dysuria, frequency and hematuria  Musculoskeletal: Negative for arthralgias, gait problem and joint swelling  Skin: Negative for rash and wound  Allergic/Immunologic: Negative for environmental allergies and food allergies  Neurological: Negative for dizziness, syncope, weakness, numbness and headaches  Hematological: Negative for adenopathy  Does not bruise/bleed easily  Psychiatric/Behavioral: Negative for confusion, decreased concentration and sleep disturbance  The patient is not nervous/anxious  Objective:      /78   Ht 5' 4" (1 626 m)   Wt 80 3 kg (177 lb)   BMI 30 38 kg/m²          Physical Exam   Constitutional: He is oriented to person, place, and time  He appears well-developed and well-nourished  HENT:   Head: Normocephalic and atraumatic  Right Ear: Hearing, tympanic membrane and external ear normal    Left Ear: Hearing, tympanic membrane and external ear normal    Eyes: Pupils are equal, round, and reactive to light  Conjunctivae and EOM are normal    Neck: Neck supple  No thyromegaly present  Cardiovascular: Normal rate and normal heart sounds  Pulmonary/Chest: Effort normal and breath sounds normal  He has no wheezes  He has no rales  Abdominal: Soft  Bowel sounds are normal  He exhibits no distension  There is no tenderness  Musculoskeletal: He exhibits no edema or tenderness  Lymphadenopathy:     He has no cervical adenopathy  Neurological: He is alert and oriented to person, place, and time  No cranial nerve deficit  Coordination normal    Skin: Skin is warm and dry  No rash noted  Psychiatric: He has a normal mood and affect   His behavior is normal  Judgment and thought content normal

## 2018-12-18 NOTE — ASSESSMENT & PLAN NOTE
Lab Results   Component Value Date    HGBA1C 7 6 (H) 02/16/2018   Patient to have A1c done today patient will be leaving for [place] in 2 weeks Patient for now will continuge with the labs as scheduled patient will see eye doctor yearly Pateint to monitoro sugars fasting sugars shoud be <120 and post meals <160  No results for input(s): POCGLU in the last 72 hours      Blood Sugar Average: Last 72 hrs:

## 2018-12-19 LAB — HBA1C MFR BLD: 7.3 % OF TOTAL HGB

## 2018-12-19 PROCEDURE — 3045F PR MOST RECENT HEMOGLOBIN A1C LEVEL 7.0-9.0%: CPT | Performed by: FAMILY MEDICINE

## 2019-03-18 ENCOUNTER — TELEPHONE (OUTPATIENT)
Dept: FAMILY MEDICINE CLINIC | Facility: CLINIC | Age: 74
End: 2019-03-18

## 2019-04-02 DIAGNOSIS — E11.9 TYPE 2 DIABETES MELLITUS WITHOUT COMPLICATION, WITHOUT LONG-TERM CURRENT USE OF INSULIN (HCC): ICD-10-CM

## 2019-04-02 DIAGNOSIS — N40.0 BENIGN PROSTATIC HYPERPLASIA WITHOUT LOWER URINARY TRACT SYMPTOMS: ICD-10-CM

## 2019-04-02 RX ORDER — FINASTERIDE 5 MG/1
TABLET, FILM COATED ORAL
Qty: 30 TABLET | Refills: 0 | Status: SHIPPED | OUTPATIENT
Start: 2019-04-02 | End: 2019-04-30 | Stop reason: SDUPTHER

## 2019-04-02 RX ORDER — GLIMEPIRIDE 2 MG/1
TABLET ORAL
Qty: 120 TABLET | Refills: 0 | Status: SHIPPED | OUTPATIENT
Start: 2019-04-02 | End: 2019-05-05 | Stop reason: SDUPTHER

## 2019-04-30 DIAGNOSIS — N40.0 BENIGN PROSTATIC HYPERPLASIA WITHOUT LOWER URINARY TRACT SYMPTOMS: ICD-10-CM

## 2019-04-30 RX ORDER — FINASTERIDE 5 MG/1
TABLET, FILM COATED ORAL
Qty: 30 TABLET | Refills: 1 | Status: SHIPPED | OUTPATIENT
Start: 2019-04-30 | End: 2019-05-29 | Stop reason: SDUPTHER

## 2019-05-05 DIAGNOSIS — E11.9 TYPE 2 DIABETES MELLITUS WITHOUT COMPLICATION, WITHOUT LONG-TERM CURRENT USE OF INSULIN (HCC): ICD-10-CM

## 2019-05-06 RX ORDER — GLIMEPIRIDE 2 MG/1
TABLET ORAL
Qty: 120 TABLET | Refills: 0 | Status: SHIPPED | OUTPATIENT
Start: 2019-05-06 | End: 2019-06-15 | Stop reason: SDUPTHER

## 2019-05-29 DIAGNOSIS — N40.0 BENIGN PROSTATIC HYPERPLASIA WITHOUT LOWER URINARY TRACT SYMPTOMS: ICD-10-CM

## 2019-05-29 RX ORDER — FINASTERIDE 5 MG/1
TABLET, FILM COATED ORAL
Qty: 30 TABLET | Refills: 0 | Status: SHIPPED | OUTPATIENT
Start: 2019-05-29

## 2019-06-15 DIAGNOSIS — E11.9 TYPE 2 DIABETES MELLITUS WITHOUT COMPLICATION, WITHOUT LONG-TERM CURRENT USE OF INSULIN (HCC): ICD-10-CM

## 2019-06-17 RX ORDER — GLIMEPIRIDE 2 MG/1
TABLET ORAL
Qty: 120 TABLET | Refills: 0 | Status: SHIPPED | OUTPATIENT
Start: 2019-06-17

## 2019-06-27 DIAGNOSIS — N40.0 BENIGN PROSTATIC HYPERPLASIA WITHOUT LOWER URINARY TRACT SYMPTOMS: ICD-10-CM

## 2019-06-27 RX ORDER — FINASTERIDE 5 MG/1
TABLET, FILM COATED ORAL
Qty: 30 TABLET | Refills: 0 | OUTPATIENT
Start: 2019-06-27

## 2019-07-05 DIAGNOSIS — E11.9 CONTROLLED TYPE 2 DIABETES MELLITUS WITHOUT COMPLICATION, WITHOUT LONG-TERM CURRENT USE OF INSULIN (HCC): ICD-10-CM

## 2019-08-07 DIAGNOSIS — E11.9 TYPE 2 DIABETES MELLITUS WITHOUT COMPLICATION, WITHOUT LONG-TERM CURRENT USE OF INSULIN (HCC): ICD-10-CM

## 2019-08-07 RX ORDER — GLIMEPIRIDE 2 MG/1
TABLET ORAL
Qty: 120 TABLET | Refills: 0 | OUTPATIENT
Start: 2019-08-07

## 2019-08-07 NOTE — TELEPHONE ENCOUNTER
I filled a prescription for this patient back in March and he was supposed to be told that he was due for checkup at that time  There is no PCP listed at this time    Is he still our patient, if so he needs to come in and see Dr Genesis Everett

## 2019-09-01 DIAGNOSIS — N40.0 BENIGN PROSTATIC HYPERPLASIA WITHOUT LOWER URINARY TRACT SYMPTOMS: ICD-10-CM

## 2019-09-26 RX ORDER — FINASTERIDE 5 MG/1
TABLET, FILM COATED ORAL
Qty: 30 TABLET | Refills: 1 | OUTPATIENT
Start: 2019-09-26

## 2019-09-27 DIAGNOSIS — J45.40 MODERATE PERSISTENT ASTHMA, UNSPECIFIED WHETHER COMPLICATED: ICD-10-CM

## 2019-09-27 RX ORDER — MONTELUKAST SODIUM 10 MG/1
TABLET ORAL
Qty: 90 TABLET | Refills: 1 | OUTPATIENT
Start: 2019-09-27

## 2019-11-25 DIAGNOSIS — N40.0 BENIGN PROSTATIC HYPERPLASIA WITHOUT LOWER URINARY TRACT SYMPTOMS: ICD-10-CM

## 2019-11-29 RX ORDER — FINASTERIDE 5 MG/1
TABLET, FILM COATED ORAL
Qty: 30 TABLET | Refills: 0 | Status: SHIPPED | OUTPATIENT
Start: 2019-11-29